# Patient Record
Sex: FEMALE | Race: WHITE | NOT HISPANIC OR LATINO | Employment: FULL TIME | URBAN - METROPOLITAN AREA
[De-identification: names, ages, dates, MRNs, and addresses within clinical notes are randomized per-mention and may not be internally consistent; named-entity substitution may affect disease eponyms.]

---

## 2017-06-28 ENCOUNTER — HOSPITAL ENCOUNTER (OUTPATIENT)
Facility: CLINIC | Age: 47
Setting detail: SPECIMEN
Discharge: HOME | End: 2017-06-28
Attending: INTERNAL MEDICINE | Admitting: INTERNAL MEDICINE
Payer: COMMERCIAL

## 2017-06-28 ENCOUNTER — ONCOLOGY VISIT (OUTPATIENT)
Dept: ONCOLOGY | Facility: CLINIC | Age: 47
End: 2017-06-28
Attending: INTERNAL MEDICINE
Payer: COMMERCIAL

## 2017-06-28 VITALS
SYSTOLIC BLOOD PRESSURE: 133 MMHG | HEART RATE: 91 BPM | TEMPERATURE: 98.4 F | BODY MASS INDEX: 23.58 KG/M2 | RESPIRATION RATE: 20 BRPM | DIASTOLIC BLOOD PRESSURE: 81 MMHG | WEIGHT: 146 LBS | OXYGEN SATURATION: 99 %

## 2017-06-28 DIAGNOSIS — C50.911 MALIGNANT NEOPLASM OF RIGHT BREAST IN FEMALE, ESTROGEN RECEPTOR POSITIVE, UNSPECIFIED SITE OF BREAST (H): Primary | ICD-10-CM

## 2017-06-28 DIAGNOSIS — Z17.0 MALIGNANT NEOPLASM OF RIGHT BREAST IN FEMALE, ESTROGEN RECEPTOR POSITIVE, UNSPECIFIED SITE OF BREAST (H): Primary | ICD-10-CM

## 2017-06-28 LAB
ALBUMIN SERPL-MCNC: 3.9 G/DL (ref 3.4–5)
ALP SERPL-CCNC: 59 U/L (ref 40–150)
ALT SERPL W P-5'-P-CCNC: 25 U/L (ref 0–50)
ANION GAP SERPL CALCULATED.3IONS-SCNC: 5 MMOL/L (ref 3–14)
AST SERPL W P-5'-P-CCNC: 21 U/L (ref 0–45)
BILIRUB SERPL-MCNC: 0.4 MG/DL (ref 0.2–1.3)
BUN SERPL-MCNC: 12 MG/DL (ref 7–30)
CALCIUM SERPL-MCNC: 8.9 MG/DL (ref 8.5–10.1)
CANCER AG27-29 SERPL-ACNC: 16 U/ML (ref 0–39)
CHLORIDE SERPL-SCNC: 109 MMOL/L (ref 94–109)
CO2 SERPL-SCNC: 27 MMOL/L (ref 20–32)
CREAT SERPL-MCNC: 0.86 MG/DL (ref 0.52–1.04)
ERYTHROCYTE [DISTWIDTH] IN BLOOD BY AUTOMATED COUNT: 12.4 % (ref 10–15)
GFR SERPL CREATININE-BSD FRML MDRD: 71 ML/MIN/1.7M2
GLUCOSE SERPL-MCNC: 105 MG/DL (ref 70–99)
HCT VFR BLD AUTO: 43.9 % (ref 35–47)
HGB BLD-MCNC: 14.4 G/DL (ref 11.7–15.7)
MCH RBC QN AUTO: 31.6 PG (ref 26.5–33)
MCHC RBC AUTO-ENTMCNC: 32.8 G/DL (ref 31.5–36.5)
MCV RBC AUTO: 96 FL (ref 78–100)
PLATELET # BLD AUTO: 160 10E9/L (ref 150–450)
POTASSIUM SERPL-SCNC: 3.8 MMOL/L (ref 3.4–5.3)
PROT SERPL-MCNC: 7.5 G/DL (ref 6.8–8.8)
RBC # BLD AUTO: 4.56 10E12/L (ref 3.8–5.2)
SODIUM SERPL-SCNC: 141 MMOL/L (ref 133–144)
WBC # BLD AUTO: 6.5 10E9/L (ref 4–11)

## 2017-06-28 PROCEDURE — 36415 COLL VENOUS BLD VENIPUNCTURE: CPT

## 2017-06-28 PROCEDURE — 86300 IMMUNOASSAY TUMOR CA 15-3: CPT | Performed by: INTERNAL MEDICINE

## 2017-06-28 PROCEDURE — 99211 OFF/OP EST MAY X REQ PHY/QHP: CPT

## 2017-06-28 PROCEDURE — 80053 COMPREHEN METABOLIC PANEL: CPT | Performed by: INTERNAL MEDICINE

## 2017-06-28 PROCEDURE — 85027 COMPLETE CBC AUTOMATED: CPT | Performed by: INTERNAL MEDICINE

## 2017-06-28 PROCEDURE — 99213 OFFICE O/P EST LOW 20 MIN: CPT | Performed by: INTERNAL MEDICINE

## 2017-06-28 ASSESSMENT — PAIN SCALES - GENERAL: PAINLEVEL: NO PAIN (0)

## 2017-06-28 NOTE — MR AVS SNAPSHOT
After Visit Summary   6/28/2017    Myla Davis    MRN: [id number]           Patient Information     Date Of Birth          1970        Visit Information        Provider Department      6/28/2017 3:30 PM Faye Landeros MD [facility]        Today's Diagnoses     Malignant neoplasm of right breast in female, estrogen receptor positive, unspecified site of breast (H)    -  1      Care Instructions        RTC MD   6 months       Labs  today  - drawn GN          Follow-ups after your visit        Your next 10 appointments already scheduled     Jun 28, 2017  3:30 PM CDT   Return Visit with Faye Landeros MD   [facility] ([facility])    [facility]  [address]  [facility] [address]   [phone]            Dec 13, 2017 11:30 AM CST   Return Visit with Faye Landeros MD   [facility] ([facility])    [facility]  [address]  [facility] [address]   [phone]              Who to contact     If you have questions or need follow up information about today's clinic visit or your schedule please contact [facility] directly at [phone].  Normal or non-critical lab and imaging results will be communicated to you by Navmiihart, letter or phone within 4 business days after the clinic has received the results. If you do not hear from us within 7 days, please contact the clinic through Navmiihart or phone. If you have a critical or abnormal lab result, we will notify you by phone as soon as possible.  Submit refill requests through [employer] or call your pharmacy and they will forward the refill request to us. Please allow 3 business days for your refill to be completed.          Additional Information About Your Visit        [employer] Information     [employer] lets you send messages  "to your doctor, view your test results, renew your prescriptions, schedule appointments and more. To sign up, go to [email or web address] . Click on \"Log in\" on the left side of the screen, which will take you to the Welcome page. Then click on \"Sign up Now\" on the right side of the page.     You will be asked to enter the access code listed below, as well as some personal information. Please follow the directions to create your username and password.     Your access code is: [email or web address]  Expires: 2017 12:21 PM     Your access code will  in 90 days. If you need help or a new code, please call your [facility] clinic or [phone].        Care EveryWhere ID     This is your Care EveryWhere ID. This could be used by other organizations to access your [facility] medical records  [id number]        Your Vitals Were     Pulse Temperature Respirations Pulse Oximetry BMI (Body Mass Index)       91 98.4  F (36.9  C) (Tympanic) 20 99% 23.58 kg/m2        Blood Pressure from Last 3 Encounters:   17 133/81   16 144/86   16 114/79    Weight from Last 3 Encounters:   17 66.2 kg (146 lb)   16 69.1 kg (152 lb 4 oz)   16 67.5 kg (148 lb 12.8 oz)              We Performed the Following     Ca27.29  breast tumor marker     CBC with platelets     Comprehensive metabolic panel (BMP + Alb, Alk Phos, ALT, AST, Total. Bili, TP)        Primary Care Provider Office Phone # Fax #    Malaika Daigle [phone]       [facility] [address] [facility] [address]        Equal Access to Services     LUKAS MENJIVAR : Henrietta Silver, kaley salinas, ruth kaalmada opal, samantha yarbrough. So [facility] [phone].    ATENCIÓN: Si habla español, tiene a berg disposición servicios gratuitos de asistencia lingüística. Llame al [phone].    We comply with applicable federal civil rights laws and [place] laws. We do not " discriminate on the basis of race, color, national origin, age, disability sex, sexual orientation or gender identity.            Thank you!     Thank you for choosing [facility]  for your care. Our goal is always to provide you with excellent care. Hearing back from our patients is one way we can continue to improve our services. Please take a few minutes to complete the written survey that you may receive in the mail after your visit with us. Thank you!             Your Updated Medication List - Protect others around you: Learn how to safely use, store and throw away your medicines at www.disposemymeds.org.          This list is accurate as of: 6/28/17 12:21 PM.  Always use your most recent med list.                   Brand Name Dispense Instructions for use Diagnosis    acetaminophen 325 MG tablet    TYLENOL    100 tablet    Take 2 tablets (650 mg) by mouth every 4 hours as needed for other (mild pain)    Breast cancer (H)       BENEFIBER DRINK MIX PO      Take 1 dose by mouth daily as needed        calcium-vitamin D 600-400 MG-UNIT per tablet    CALTRATE     Take 1 tablet by mouth daily        diphenhydrAMINE-acetaminophen  MG tablet    TYLENOL PM     Take 1 tablet by mouth nightly as needed        guaiFENesin 600 MG 12 hr tablet    MUCINEX     Take 1,200 mg by mouth 2 times daily as needed        ibuprofen 600 MG tablet    ADVIL/MOTRIN    30 tablet    Take 1 tablet (600 mg) by mouth every 6 hours as needed for pain (mild)    Superficial foreign body of axilla, right, initial encounter       Multi-vitamin Tabs tablet      Take 1 tablet by mouth daily        OMEPRAZOLE PO      Take 40 mg by mouth every morning        SUDAFED PO      Take 30 mg by mouth 2 times daily as needed        tamoxifen 20 MG tablet    NOLVADEX    90 tablet    Take 1 tablet (20 mg) by mouth daily    Malignant neoplasm of central portion of right female breast (H)       vitamin B complex with vitamin C Tabs  tablet      Take 1 tablet by mouth daily        VITAMIN C PO      Take 500 mg by mouth daily        VITAMIN D (CHOLECALCIFEROL) PO      Take 1,000 Units by mouth daily        ZYRTEC PO      Take 10 mg by mouth daily PRN

## 2017-06-28 NOTE — NURSING NOTE
"Oncology Rooming Note    June 28, 2017 11:35 AM   Myla Davis is a 46 year old female who presents for:    Chief Complaint   Patient presents with     Oncology Clinic Visit     follow up      Initial Vitals: /81  Pulse 91  Temp 98.4  F (36.9  C) (Tympanic)  Resp 20  Wt 66.2 kg (146 lb)  SpO2 99%  BMI 23.58 kg/m2 Estimated body mass index is 23.58 kg/(m^2) as calculated from the following:    Height as of 12/29/16: 1.676 m (5' 5.98\").    Weight as of this encounter: 66.2 kg (146 lb). Body surface area is 1.76 meters squared.  No Pain (0) Comment: Data Unavailable   No LMP recorded. Patient is not currently having periods (Reason: UNKNOWN).  Allergies reviewed: Yes  Medications reviewed: Yes    Medications: Medication refills not needed today.  Pharmacy name entered into [facility]:    [facility] [address] [place] [address] AT [facility] [address]  [facility] [place], [place] - [facility] [address]    Clinical concerns: Follow up      8 minutes for nursing intake (face to face time)     Susana Garcia CMA   DISCHARGE PLAN:  Next appointments: See patient instruction section  Departure Mode: Ambulatory  Accompanied by: self  0 minutes for nursing discharge (face to face time)   Susana Garcia CMA                  "

## 2017-07-09 NOTE — PROGRESS NOTES
Hematology / Oncology Progress Note         Assessment & Plan   Myla Davis is a 46 year old female diagnosed at the age 42 with lobular carcinoma of the right breast.  She will continue on tamoxifen.  The plan is to do tamoxifen for 5 years and then follow up with an aromatase inhibitor for 5 years. I will see her back in 6 months .          Faye Landeros    Interval History      Myla Davis is 45 years old with a diagnosis of breast cancer.  She was diagnosed at age 42 in 09/2013.  At that point she had an invasive lobular carcinoma of the right breast.  She had multiple foci and negative lymph nodes, ER/OR positive and HER2 receptor negative.  She elected to do bilateral mastectomies.  Prior to doing the bilateral mastectomies she had neoadjuvant dose-dense Adriamycin, Cytoxan followed by dose-dense Taxol.  She is currently now status post bilateral mastectomies post-mastectomy radiation therapy and on tamoxifen.  She has been on the tamoxifen now about 3 1/2 years.  She is tolerating it well without any major side effects.  She denies today cough, shortness of breath, bone pain, anything worrisome.     14 point ROS neg     Meds as charted   Allergies as charted  Physical Exam                      Vitals:    06/28/17 1130   Weight: 66.2 kg (146 lb)     Vital Signs normal        Constitutional: awake, alert, cooperative, no apparent distress, and appears stated age  Eyes: Lids and lashes normal, pupils equal, round and reactive to light, extra ocular muscles intact, sclera clear, conjunctiva normal  ENT: Normocephalic, without obvious abnormality, atramatic, sinuses nontender on palpation, external ears without lesions, oral pharynx with moist mucus membranes, tonsils without erythema or exudates, gums normal and good dentition.  Hematologic / Lymphatic: normal   Respiratory: No increased work of breathing, good air exchange, clear to auscultation bilaterally, no crackles or  wheezing  Cardiovascular: Normal apical impulse, regular rate and rhythm, normal S1 and S2, no S3 or S4, and no murmur noted  GI: normal   Skin: normal skin color, texture, turgor  Neurologic: Awake, alert, oriented to name, place and time.    Breast    bilat mastectomies             Data   Labs pending

## 2017-07-14 ENCOUNTER — TELEPHONE (OUTPATIENT)
Dept: ONCOLOGY | Facility: CLINIC | Age: 47
End: 2017-07-14

## 2017-07-14 NOTE — TELEPHONE ENCOUNTER
Called patient per Dr. Landeros, and left voicemail message that her recent blood work was all normal.

## 2017-12-20 ENCOUNTER — ONCOLOGY VISIT (OUTPATIENT)
Dept: ONCOLOGY | Facility: CLINIC | Age: 47
End: 2017-12-20
Attending: NURSE PRACTITIONER
Payer: COMMERCIAL

## 2017-12-20 ENCOUNTER — HOSPITAL ENCOUNTER (OUTPATIENT)
Facility: CLINIC | Age: 47
Setting detail: SPECIMEN
Discharge: HOME | End: 2017-12-20
Attending: NURSE PRACTITIONER | Admitting: NURSE PRACTITIONER
Payer: COMMERCIAL

## 2017-12-20 VITALS
BODY MASS INDEX: 24.68 KG/M2 | DIASTOLIC BLOOD PRESSURE: 81 MMHG | SYSTOLIC BLOOD PRESSURE: 130 MMHG | HEART RATE: 80 BPM | WEIGHT: 152.8 LBS | TEMPERATURE: 98 F | OXYGEN SATURATION: 100 % | RESPIRATION RATE: 16 BRPM

## 2017-12-20 DIAGNOSIS — C50.111 MALIGNANT NEOPLASM OF CENTRAL PORTION OF RIGHT BREAST IN FEMALE, ESTROGEN RECEPTOR POSITIVE (H): ICD-10-CM

## 2017-12-20 DIAGNOSIS — Z17.0 MALIGNANT NEOPLASM OF CENTRAL PORTION OF RIGHT BREAST IN FEMALE, ESTROGEN RECEPTOR POSITIVE (H): ICD-10-CM

## 2017-12-20 DIAGNOSIS — C50.411 MALIGNANT NEOPLASM OF UPPER-OUTER QUADRANT OF RIGHT BREAST IN FEMALE, ESTROGEN RECEPTOR POSITIVE (H): Primary | ICD-10-CM

## 2017-12-20 DIAGNOSIS — Z17.0 MALIGNANT NEOPLASM OF UPPER-OUTER QUADRANT OF RIGHT BREAST IN FEMALE, ESTROGEN RECEPTOR POSITIVE (H): Primary | ICD-10-CM

## 2017-12-20 LAB
ALBUMIN SERPL-MCNC: 3.6 G/DL (ref 3.4–5)
ALP SERPL-CCNC: 57 U/L (ref 40–150)
ALT SERPL W P-5'-P-CCNC: 24 U/L (ref 0–50)
ANION GAP SERPL CALCULATED.3IONS-SCNC: 7 MMOL/L (ref 3–14)
AST SERPL W P-5'-P-CCNC: 15 U/L (ref 0–45)
BASOPHILS # BLD AUTO: 0.1 10E9/L (ref 0–0.2)
BASOPHILS NFR BLD AUTO: 1.1 %
BILIRUB SERPL-MCNC: 0.3 MG/DL (ref 0.2–1.3)
BUN SERPL-MCNC: 16 MG/DL (ref 7–30)
CALCIUM SERPL-MCNC: 8.8 MG/DL (ref 8.5–10.1)
CANCER AG27-29 SERPL-ACNC: 5 U/ML (ref 0–39)
CHLORIDE SERPL-SCNC: 107 MMOL/L (ref 94–109)
CO2 SERPL-SCNC: 28 MMOL/L (ref 20–32)
CREAT SERPL-MCNC: 0.72 MG/DL (ref 0.52–1.04)
DIFFERENTIAL METHOD BLD: NORMAL
EOSINOPHIL # BLD AUTO: 0.1 10E9/L (ref 0–0.7)
EOSINOPHIL NFR BLD AUTO: 1.9 %
ERYTHROCYTE [DISTWIDTH] IN BLOOD BY AUTOMATED COUNT: 12.1 % (ref 10–15)
GFR SERPL CREATININE-BSD FRML MDRD: 87 ML/MIN/1.7M2
GLUCOSE SERPL-MCNC: 98 MG/DL (ref 70–99)
HCT VFR BLD AUTO: 39.4 % (ref 35–47)
HGB BLD-MCNC: 12.9 G/DL (ref 11.7–15.7)
IMM GRANULOCYTES # BLD: 0 10E9/L (ref 0–0.4)
IMM GRANULOCYTES NFR BLD: 0.2 %
LYMPHOCYTES # BLD AUTO: 1.6 10E9/L (ref 0.8–5.3)
LYMPHOCYTES NFR BLD AUTO: 34.3 %
MCH RBC QN AUTO: 31.4 PG (ref 26.5–33)
MCHC RBC AUTO-ENTMCNC: 32.7 G/DL (ref 31.5–36.5)
MCV RBC AUTO: 96 FL (ref 78–100)
MONOCYTES # BLD AUTO: 0.3 10E9/L (ref 0–1.3)
MONOCYTES NFR BLD AUTO: 6.2 %
NEUTROPHILS # BLD AUTO: 2.6 10E9/L (ref 1.6–8.3)
NEUTROPHILS NFR BLD AUTO: 56.3 %
NRBC # BLD AUTO: 0 10*3/UL
NRBC BLD AUTO-RTO: 0 /100
PLATELET # BLD AUTO: 178 10E9/L (ref 150–450)
POTASSIUM SERPL-SCNC: 4 MMOL/L (ref 3.4–5.3)
PROT SERPL-MCNC: 7.3 G/DL (ref 6.8–8.8)
RBC # BLD AUTO: 4.11 10E12/L (ref 3.8–5.2)
SODIUM SERPL-SCNC: 142 MMOL/L (ref 133–144)
WBC # BLD AUTO: 4.7 10E9/L (ref 4–11)

## 2017-12-20 PROCEDURE — 99213 OFFICE O/P EST LOW 20 MIN: CPT | Performed by: NURSE PRACTITIONER

## 2017-12-20 PROCEDURE — 86300 IMMUNOASSAY TUMOR CA 15-3: CPT | Performed by: NURSE PRACTITIONER

## 2017-12-20 PROCEDURE — 80053 COMPREHEN METABOLIC PANEL: CPT | Performed by: NURSE PRACTITIONER

## 2017-12-20 PROCEDURE — 99211 OFF/OP EST MAY X REQ PHY/QHP: CPT

## 2017-12-20 PROCEDURE — 85025 COMPLETE CBC W/AUTO DIFF WBC: CPT | Performed by: NURSE PRACTITIONER

## 2017-12-20 RX ORDER — TAMOXIFEN CITRATE 20 MG/1
20 TABLET ORAL DAILY
Qty: 90 TABLET | Refills: 3 | Status: SHIPPED | OUTPATIENT
Start: 2017-12-20 | End: 2019-07-01

## 2017-12-20 RX ORDER — TAMOXIFEN CITRATE 20 MG/1
20 TABLET ORAL DAILY
Qty: 90 TABLET | Refills: 3 | Status: CANCELLED | OUTPATIENT
Start: 2017-12-20

## 2017-12-20 ASSESSMENT — PAIN SCALES - GENERAL: PAINLEVEL: NO PAIN (0)

## 2017-12-20 NOTE — PATIENT INSTRUCTIONS
Labs today    Schedule with Dr Landeros in 6 months -Scheduled for 6/20/18. Tish GALVIN  AVS printed given to Pt. Tish GALVIN

## 2017-12-20 NOTE — PROGRESS NOTES
Oncology/Hematology Visit Note  Dec 20, 2017    Reason for Visit: follow up of  Right breast cancer     History of Present Illness: Myla Davis is a 47 year old female with  Right breast cancer . She is currently undergoing treatment with tamoxifen. Please see previous notes for further details on the patient's history. She comes in today for routine follow     Interval History:    She is feeling great, she denies fever chills or sweats, denies cough or SOB , denies NVD, denies abdominal pain, she dneis adenopathy she has been tolerating tamoxifen well      Review of Systems:  14 point ROS of systems including Constitutional, Eyes, Respiratory, Cardiovascular, Gastroenterology, Genitourinary, Integumentary, Muscularskeletal, Psychiatric were all negative except for pertinent positives noted in my HPI.      Current Outpatient Prescriptions   Medication Sig Dispense Refill     tamoxifen (NOLVADEX) 20 MG tablet Take 1 tablet (20 mg) by mouth daily 90 tablet 3     multivitamin, therapeutic with minerals (MULTI-VITAMIN) TABS Take 1 tablet by mouth daily       VITAMIN D, CHOLECALCIFEROL, PO Take 1,000 Units by mouth daily       vitamin  B complex with vitamin C (VITAMIN  B COMPLEX) TABS Take 1 tablet by mouth daily       Ascorbic Acid (VITAMIN C PO) Take 500 mg by mouth daily       OMEPRAZOLE PO Take 40 mg by mouth every morning       ibuprofen (ADVIL,MOTRIN) 600 MG tablet Take 1 tablet (600 mg) by mouth every 6 hours as needed for pain (mild) 30 tablet 0     calcium-vitamin D (CALTRATE) 600-400 MG-UNIT per tablet Take 1 tablet by mouth daily       guaiFENesin (MUCINEX) 600 MG 12 hr tablet Take 1,200 mg by mouth 2 times daily as needed       Wheat Dextrin (BENEFIBER DRINK MIX PO) Take 1 dose by mouth daily as needed        Pseudoephedrine HCl (SUDAFED PO) Take 30 mg by mouth 2 times daily as needed        diphenhydrAMINE-acetaminophen (TYLENOL PM)  MG tablet Take 1 tablet by mouth nightly as needed        acetaminophen (TYLENOL) 325 MG tablet Take 2 tablets (650 mg) by mouth every 4 hours as needed for other (mild pain) 100 tablet 0     Cetirizine HCl (ZYRTEC PO) Take 10 mg by mouth daily PRN          [DISCONTINUED] tamoxifen (NOLVADEX) 20 MG tablet Take 1 tablet (20 mg) by mouth daily 90 tablet 3       Physical Examination:  General: The patient is a pleasant female in no acute distress.  /81  Pulse 80  Temp 98  F (36.7  C) (Tympanic)  Resp 16  Wt 69.3 kg (152 lb 12.8 oz)  SpO2 100%  BMI 24.68 kg/m2  Wt Readings from Last 10 Encounters:   12/20/17 69.3 kg (152 lb 12.8 oz)   06/28/17 66.2 kg (146 lb)   12/29/16 69.1 kg (152 lb 4 oz)   06/29/16 67.5 kg (148 lb 12.8 oz)   12/30/15 67.1 kg (148 lb)   08/25/15 64.9 kg (143 lb)   04/16/15 64.4 kg (142 lb)   12/15/14 60.8 kg (134 lb)   11/04/14 61.2 kg (135 lb)   07/08/14 59.9 kg (132 lb)     HEENT: EOMI, PERRL. Sclerae are anicteric. Oral mucosa is pink and moist with no lesions or thrush.   Lymph: Neck is supple with no lymphadenopathy in the cervical or supraclavicular areas.   Heart: Regular rate and rhythm.   Lungs: Clear to auscultation bilaterally.   GI: Bowel sounds present, soft, nontender with no palpable hepatosplenomegaly or masses.   Extremities: No lower extremity edema noted bilaterally.     Skin: No rashes, petechiae, or bruising noted on exposed skin.    Laboratory Data:  Results for DEBBIE FARIAS (MRN [id number]) as of 12/20/2017 13:40   Ref. Range 12/20/2017 11:40   Sodium Latest Ref Range: 133 - 144 mmol/L 142   Potassium Latest Ref Range: 3.4 - 5.3 mmol/L 4.0   Chloride Latest Ref Range: 94 - 109 mmol/L 107   Carbon Dioxide Latest Ref Range: 20 - 32 mmol/L 28   Urea Nitrogen Latest Ref Range: 7 - 30 mg/dL 16   Creatinine Latest Ref Range: 0.52 - 1.04 mg/dL 0.72   GFR Estimate Latest Ref Range: >60 mL/min/1.7m2 87   GFR Estimate If Black Latest Ref Range: >60 mL/min/1.7m2 >90   Calcium Latest Ref Range: 8.5 - 10.1 mg/dL 8.8   Anion Gap  Latest Ref Range: 3 - 14 mmol/L 7   Albumin Latest Ref Range: 3.4 - 5.0 g/dL 3.6   Protein Total Latest Ref Range: 6.8 - 8.8 g/dL 7.3   Bilirubin Total Latest Ref Range: 0.2 - 1.3 mg/dL 0.3   Alkaline Phosphatase Latest Ref Range: 40 - 150 U/L 57   ALT Latest Ref Range: 0 - 50 U/L 24   AST Latest Ref Range: 0 - 45 U/L 15   Glucose Latest Ref Range: 70 - 99 mg/dL 98   WBC Latest Ref Range: 4.0 - 11.0 10e9/L 4.7   Hemoglobin Latest Ref Range: 11.7 - 15.7 g/dL 12.9   Hematocrit Latest Ref Range: 35.0 - 47.0 % 39.4   Platelet Count Latest Ref Range: 150 - 450 10e9/L 178   RBC Count Latest Ref Range: 3.8 - 5.2 10e12/L 4.11   MCV Latest Ref Range: 78 - 100 fl 96   MCH Latest Ref Range: 26.5 - 33.0 pg 31.4   MCHC Latest Ref Range: 31.5 - 36.5 g/dL 32.7   RDW Latest Ref Range: 10.0 - 15.0 % 12.1   Diff Method Unknown Automated Method   % Neutrophils Latest Units: % 56.3   % Lymphocytes Latest Units: % 34.3   % Monocytes Latest Units: % 6.2   % Eosinophils Latest Units: % 1.9   % Basophils Latest Units: % 1.1   % Immature Granulocytes Latest Units: % 0.2   Nucleated RBCs Latest Ref Range: 0 /100 0   Absolute Neutrophil Latest Ref Range: 1.6 - 8.3 10e9/L 2.6   Absolute Lymphocytes Latest Ref Range: 0.8 - 5.3 10e9/L 1.6   Absolute Monocytes Latest Ref Range: 0.0 - 1.3 10e9/L 0.3   Absolute Eosinophils Latest Ref Range: 0.0 - 0.7 10e9/L 0.1   Absolute Basophils Latest Ref Range: 0.0 - 0.2 10e9/L 0.1   Abs Immature Granulocytes Latest Ref Range: 0 - 0.4 10e9/L 0.0   Absolute Nucleated RBC Unknown 0.0       Assessment and Plan:    Breast cancer   ER/NV positive and HER2 receptor negative  S/p AC and taxol   S/p  bilateral mastectomies post-mastectomy radiation therapy currently on tamoxifen. She has been on it for almost 4 years . No evidence of cancer recurrence.  Plan is to continue with it tamoxifen for 1 more year -total 5 years then follow up with an aromatase inhibitor for 5 years.  Check labs check CA27-29 Schedule with   Tigre  in 6 months.    EMERSON Graham CNP

## 2017-12-20 NOTE — LETTER
12/20/2017         RE: Myla Davis  [address] [facility] [address]        Dear Colleague,    Thank you for referring your patient, Myla Davis, to the [facility]. Please see a copy of my visit note below.    Oncology/Hematology Visit Note  Dec 20, 2017    Reason for Visit: follow up of  Right breast cancer     History of Present Illness: Myla Davis is a 47 year old female with  Right breast cancer . She is currently undergoing treatment with tamoxifen. Please see previous notes for further details on the patient's history. She comes in today for routine follow     Interval History:    She is feeling great, she denies fever chills or sweats, denies cough or SOB , denies NVD, denies abdominal pain, she dneis adenopathy she has been tolerating tamoxifen well      Review of Systems:  14 point ROS of systems including Constitutional, Eyes, Respiratory, Cardiovascular, Gastroenterology, Genitourinary, Integumentary, Muscularskeletal, Psychiatric were all negative except for pertinent positives noted in my HPI.      Current Outpatient Prescriptions   Medication Sig Dispense Refill     tamoxifen (NOLVADEX) 20 MG tablet Take 1 tablet (20 mg) by mouth daily 90 tablet 3     multivitamin, therapeutic with minerals (MULTI-VITAMIN) TABS Take 1 tablet by mouth daily       VITAMIN D, CHOLECALCIFEROL, PO Take 1,000 Units by mouth daily       vitamin  B complex with vitamin C (VITAMIN  B COMPLEX) TABS Take 1 tablet by mouth daily       Ascorbic Acid (VITAMIN C PO) Take 500 mg by mouth daily       OMEPRAZOLE PO Take 40 mg by mouth every morning       ibuprofen (ADVIL,MOTRIN) 600 MG tablet Take 1 tablet (600 mg) by mouth every 6 hours as needed for pain (mild) 30 tablet 0     calcium-vitamin D (CALTRATE) 600-400 MG-UNIT per tablet Take 1 tablet by mouth daily       guaiFENesin (MUCINEX) 600 MG 12 hr tablet Take 1,200 mg by mouth 2 times daily as needed       Wheat Dextrin  (BENEFIBER DRINK MIX PO) Take 1 dose by mouth daily as needed        Pseudoephedrine HCl (SUDAFED PO) Take 30 mg by mouth 2 times daily as needed        diphenhydrAMINE-acetaminophen (TYLENOL PM)  MG tablet Take 1 tablet by mouth nightly as needed       acetaminophen (TYLENOL) 325 MG tablet Take 2 tablets (650 mg) by mouth every 4 hours as needed for other (mild pain) 100 tablet 0     Cetirizine HCl (ZYRTEC PO) Take 10 mg by mouth daily PRN          [DISCONTINUED] tamoxifen (NOLVADEX) 20 MG tablet Take 1 tablet (20 mg) by mouth daily 90 tablet 3       Physical Examination:  General: The patient is a pleasant female in no acute distress.  /81  Pulse 80  Temp 98  F (36.7  C) (Tympanic)  Resp 16  Wt 69.3 kg (152 lb 12.8 oz)  SpO2 100%  BMI 24.68 kg/m2  Wt Readings from Last 10 Encounters:   12/20/17 69.3 kg (152 lb 12.8 oz)   06/28/17 66.2 kg (146 lb)   12/29/16 69.1 kg (152 lb 4 oz)   06/29/16 67.5 kg (148 lb 12.8 oz)   12/30/15 67.1 kg (148 lb)   08/25/15 64.9 kg (143 lb)   04/16/15 64.4 kg (142 lb)   12/15/14 60.8 kg (134 lb)   11/04/14 61.2 kg (135 lb)   07/08/14 59.9 kg (132 lb)     HEENT: EOMI, PERRL. Sclerae are anicteric. Oral mucosa is pink and moist with no lesions or thrush.   Lymph: Neck is supple with no lymphadenopathy in the cervical or supraclavicular areas.   Heart: Regular rate and rhythm.   Lungs: Clear to auscultation bilaterally.   GI: Bowel sounds present, soft, nontender with no palpable hepatosplenomegaly or masses.   Extremities: No lower extremity edema noted bilaterally.     Skin: No rashes, petechiae, or bruising noted on exposed skin.    Laboratory Data:  Results for DEBBIE FARIAS (MRN [id number]) as of 12/20/2017 13:40   Ref. Range 12/20/2017 11:40   Sodium Latest Ref Range: 133 - 144 mmol/L 142   Potassium Latest Ref Range: 3.4 - 5.3 mmol/L 4.0   Chloride Latest Ref Range: 94 - 109 mmol/L 107   Carbon Dioxide Latest Ref Range: 20 - 32 mmol/L 28   Urea Nitrogen  Latest Ref Range: 7 - 30 mg/dL 16   Creatinine Latest Ref Range: 0.52 - 1.04 mg/dL 0.72   GFR Estimate Latest Ref Range: >60 mL/min/1.7m2 87   GFR Estimate If Black Latest Ref Range: >60 mL/min/1.7m2 >90   Calcium Latest Ref Range: 8.5 - 10.1 mg/dL 8.8   Anion Gap Latest Ref Range: 3 - 14 mmol/L 7   Albumin Latest Ref Range: 3.4 - 5.0 g/dL 3.6   Protein Total Latest Ref Range: 6.8 - 8.8 g/dL 7.3   Bilirubin Total Latest Ref Range: 0.2 - 1.3 mg/dL 0.3   Alkaline Phosphatase Latest Ref Range: 40 - 150 U/L 57   ALT Latest Ref Range: 0 - 50 U/L 24   AST Latest Ref Range: 0 - 45 U/L 15   Glucose Latest Ref Range: 70 - 99 mg/dL 98   WBC Latest Ref Range: 4.0 - 11.0 10e9/L 4.7   Hemoglobin Latest Ref Range: 11.7 - 15.7 g/dL 12.9   Hematocrit Latest Ref Range: 35.0 - 47.0 % 39.4   Platelet Count Latest Ref Range: 150 - 450 10e9/L 178   RBC Count Latest Ref Range: 3.8 - 5.2 10e12/L 4.11   MCV Latest Ref Range: 78 - 100 fl 96   MCH Latest Ref Range: 26.5 - 33.0 pg 31.4   MCHC Latest Ref Range: 31.5 - 36.5 g/dL 32.7   RDW Latest Ref Range: 10.0 - 15.0 % 12.1   Diff Method Unknown Automated Method   % Neutrophils Latest Units: % 56.3   % Lymphocytes Latest Units: % 34.3   % Monocytes Latest Units: % 6.2   % Eosinophils Latest Units: % 1.9   % Basophils Latest Units: % 1.1   % Immature Granulocytes Latest Units: % 0.2   Nucleated RBCs Latest Ref Range: 0 /100 0   Absolute Neutrophil Latest Ref Range: 1.6 - 8.3 10e9/L 2.6   Absolute Lymphocytes Latest Ref Range: 0.8 - 5.3 10e9/L 1.6   Absolute Monocytes Latest Ref Range: 0.0 - 1.3 10e9/L 0.3   Absolute Eosinophils Latest Ref Range: 0.0 - 0.7 10e9/L 0.1   Absolute Basophils Latest Ref Range: 0.0 - 0.2 10e9/L 0.1   Abs Immature Granulocytes Latest Ref Range: 0 - 0.4 10e9/L 0.0   Absolute Nucleated RBC Unknown 0.0       Assessment and Plan:    Breast cancer   ER/[place] positive and HER2 receptor negative  S/p AC and taxol   S/p  bilateral mastectomies post-mastectomy radiation therapy  currently on tamoxifen. She has been on it for almost 4 years . No evidence of cancer recurrence.  Plan is to continue with it tamoxifen for 1 more year -total 5 years then follow up with an aromatase inhibitor for 5 years.  Check labs check CA27-29 Schedule with Dr Landeros  in 6 months.    EMERSON Graham CNP              Again, thank you for allowing me to participate in the care of your patient.        Sincerely,        EMERSON Graham CNP

## 2017-12-20 NOTE — MR AVS SNAPSHOT
"              After Visit Summary   12/20/2017    Myla Davis    MRN: [id number]           Patient Information     Date Of Birth          1970        Visit Information        Provider Department      12/20/2017 11:00 AM Flo Scott APRN CNP [facility]        Today's Diagnoses     Malignant neoplasm of upper-outer quadrant of right breast in female, estrogen receptor positive (H)    -  1    Malignant neoplasm of central portion of right breast in female, estrogen receptor positive (H)          Care Instructions    Labs today    Schedule with Dr Landeros in 6 months             Follow-ups after your visit        Your next 10 appointments already scheduled     Jun 20, 2018 10:30 AM CDT   Return Visit with Faye Landeros MD   [facility] ([facility])    [facility]  [address] [address]  [address] [address] [phone]              Who to contact     If you have questions or need follow up information about today's clinic visit or your schedule please contact [facility] directly at [phone].  Normal or non-critical lab and imaging results will be communicated to you by MyChart, letter or phone within 4 business days after the clinic has received the results. If you do not hear from us within 7 days, please contact the clinic through AMXhart or phone. If you have a critical or abnormal lab result, we will notify you by phone as soon as possible.  Submit refill requests through TrashOut or call your pharmacy and they will forward the refill request to us. Please allow 3 business days for your refill to be completed.          Additional Information About Your Visit        MyChart Information     TrashOut lets you send messages to your doctor, view your test results, renew your prescriptions, schedule appointments and more. To sign up, go to [email or web address] . Click on \"Log " "in\" on the left side of the screen, which will take you to the Welcome page. Then click on \"Sign up Now\" on the right side of the page.     You will be asked to enter the access code listed below, as well as some personal information. Please follow the directions to create your username and password.     Your access code is: [email or web address]  Expires: 3/20/2018 11:30 AM     Your access code will  in 90 days. If you need help or a new code, please call your [place] clinic or [phone].        Care EveryWhere ID     This is your Care EveryWhere ID. This could be used by other organizations to access your [place] medical records  [id number]        Your Vitals Were     Pulse Temperature Respirations Pulse Oximetry BMI (Body Mass Index)       80 98  F (36.7  C) (Tympanic) 16 100% 24.68 kg/m2        Blood Pressure from Last 3 Encounters:   17 130/81   17 133/81   16 144/86    Weight from Last 3 Encounters:   17 69.3 kg (152 lb 12.8 oz)   17 66.2 kg (146 lb)   16 69.1 kg (152 lb 4 oz)              We Performed the Following     Comprehensive metabolic panel (BMP + Alb, Alk Phos, ALT, AST, Total. Bili, TP)          Where to get your medicines      These medications were sent to [facility] [address] [place] [address]  [facility] [[email or web address]] AT [facility] &  [address] [place] [address]     Phone:  [phone]     tamoxifen 20 MG tablet          Primary Care Provider Office Phone # Fax #    Malaika Daigle [phone]       [facility] [address] [facility] [address]        Equal Access to Services     [facility] OTTO AH: Henrietta Silver, waaxda luqadaha, qaybta kaalmada opal, samantha yarbrough. So [facility] [phone].    ATENCIÓN: Si habla español, tiene a berg disposición servicios gratuitos de asistencia lingüística. Llame al [phone].    We comply with applicable federal civil rights laws " and [place] laws. We do not discriminate on the basis of race, color, national origin, age, disability, sex, sexual orientation, or gender identity.            Thank you!     Thank you for choosing [facility]  for your care. Our goal is always to provide you with excellent care. Hearing back from our patients is one way we can continue to improve our services. Please take a few minutes to complete the written survey that you may receive in the mail after your visit with us. Thank you!             Your Updated Medication List - Protect others around you: Learn how to safely use, store and throw away your medicines at www.disposemymeds.org.          This list is accurate as of: 12/20/17 11:48 AM.  Always use your most recent med list.                   Brand Name Dispense Instructions for use Diagnosis    acetaminophen 325 MG tablet    TYLENOL    100 tablet    Take 2 tablets (650 mg) by mouth every 4 hours as needed for other (mild pain)    Breast cancer (H)       BENEFIBER DRINK MIX PO      Take 1 dose by mouth daily as needed        calcium-vitamin D 600-400 MG-UNIT per tablet    CALTRATE     Take 1 tablet by mouth daily        diphenhydrAMINE-acetaminophen  MG tablet    TYLENOL PM     Take 1 tablet by mouth nightly as needed        guaiFENesin 600 MG 12 hr tablet    MUCINEX     Take 1,200 mg by mouth 2 times daily as needed        ibuprofen 600 MG tablet    ADVIL/MOTRIN    30 tablet    Take 1 tablet (600 mg) by mouth every 6 hours as needed for pain (mild)    Superficial foreign body of axilla, right, initial encounter       Multi-vitamin Tabs tablet      Take 1 tablet by mouth daily        OMEPRAZOLE PO      Take 40 mg by mouth every morning        SUDAFED PO      Take 30 mg by mouth 2 times daily as needed        tamoxifen 20 MG tablet    NOLVADEX    90 tablet    Take 1 tablet (20 mg) by mouth daily    Malignant neoplasm of central portion of right breast in female, estrogen  receptor positive (H)       vitamin B complex with vitamin C Tabs tablet      Take 1 tablet by mouth daily        VITAMIN C PO      Take 500 mg by mouth daily        VITAMIN D (CHOLECALCIFEROL) PO      Take 1,000 Units by mouth daily        ZYRTEC PO      Take 10 mg by mouth daily PRN

## 2017-12-20 NOTE — NURSING NOTE
"Oncology Rooming Note    December 20, 2017 11:02 AM   Myla Davis is a 47 year old female who presents for:    Chief Complaint   Patient presents with     Oncology Clinic Visit     Follow-up     Initial Vitals: /81  Pulse 80  Temp 98  F (36.7  C) (Tympanic)  Resp 16  Wt 69.3 kg (152 lb 12.8 oz)  SpO2 100%  BMI 24.68 kg/m2 Estimated body mass index is 24.68 kg/(m^2) as calculated from the following:    Height as of 12/29/16: 1.676 m (5' 5.98\").    Weight as of this encounter: 69.3 kg (152 lb 12.8 oz). Body surface area is 1.8 meters squared.  No Pain (0) Comment: Data Unavailable   No LMP recorded. Patient is not currently having periods (Reason: UNKNOWN).  Allergies reviewed: Yes  Medications reviewed: Yes    Medications: MEDICATION REFILLS NEEDED TODAY. Provider was notified. Tamoxifen  Pharmacy name entered into [facility]:    [facility] [address] [place] [address] - [address] [place] [address] AT [facility] [address]  [facility] [place], [place] - [facility] [address]    Clinical concerns: Follow up    8 minutes for nursing intake (face to face time)     Wendie Lozano CMA      DISCHARGE PLAN:  Next appointments: See patient instruction section  Departure Mode: Ambulatory  Accompanied by: self  0 minutes for nursing discharge (face to face time)   Wendie Lozano CMA                "

## 2018-06-27 ENCOUNTER — ONCOLOGY VISIT (OUTPATIENT)
Dept: ONCOLOGY | Facility: CLINIC | Age: 48
End: 2018-06-27
Attending: INTERNAL MEDICINE
Payer: COMMERCIAL

## 2018-06-27 ENCOUNTER — HOSPITAL ENCOUNTER (OUTPATIENT)
Facility: CLINIC | Age: 48
Setting detail: SPECIMEN
Discharge: HOME | End: 2018-06-27
Attending: INTERNAL MEDICINE | Admitting: INTERNAL MEDICINE
Payer: COMMERCIAL

## 2018-06-27 VITALS
OXYGEN SATURATION: 99 % | HEART RATE: 81 BPM | TEMPERATURE: 98.1 F | BODY MASS INDEX: 23.85 KG/M2 | RESPIRATION RATE: 16 BRPM | SYSTOLIC BLOOD PRESSURE: 122 MMHG | DIASTOLIC BLOOD PRESSURE: 76 MMHG | WEIGHT: 148.4 LBS | HEIGHT: 66 IN

## 2018-06-27 DIAGNOSIS — Z17.0 MALIGNANT NEOPLASM OF RIGHT BREAST IN FEMALE, ESTROGEN RECEPTOR POSITIVE, UNSPECIFIED SITE OF BREAST (H): Primary | ICD-10-CM

## 2018-06-27 DIAGNOSIS — C50.911 MALIGNANT NEOPLASM OF RIGHT BREAST IN FEMALE, ESTROGEN RECEPTOR POSITIVE, UNSPECIFIED SITE OF BREAST (H): Primary | ICD-10-CM

## 2018-06-27 LAB
ALBUMIN SERPL-MCNC: 3.9 G/DL (ref 3.4–5)
ALP SERPL-CCNC: 61 U/L (ref 40–150)
ALT SERPL W P-5'-P-CCNC: 19 U/L (ref 0–50)
ANION GAP SERPL CALCULATED.3IONS-SCNC: 5 MMOL/L (ref 3–14)
AST SERPL W P-5'-P-CCNC: 16 U/L (ref 0–45)
BILIRUB SERPL-MCNC: 0.3 MG/DL (ref 0.2–1.3)
BUN SERPL-MCNC: 12 MG/DL (ref 7–30)
CALCIUM SERPL-MCNC: 8.9 MG/DL (ref 8.5–10.1)
CANCER AG27-29 SERPL-ACNC: 18 U/ML (ref 0–39)
CHLORIDE SERPL-SCNC: 105 MMOL/L (ref 94–109)
CO2 SERPL-SCNC: 29 MMOL/L (ref 20–32)
CREAT SERPL-MCNC: 0.77 MG/DL (ref 0.52–1.04)
ERYTHROCYTE [DISTWIDTH] IN BLOOD BY AUTOMATED COUNT: 11.8 % (ref 10–15)
GFR SERPL CREATININE-BSD FRML MDRD: 80 ML/MIN/1.7M2
GLUCOSE SERPL-MCNC: 93 MG/DL (ref 70–99)
HCT VFR BLD AUTO: 41.9 % (ref 35–47)
HGB BLD-MCNC: 13.9 G/DL (ref 11.7–15.7)
MCH RBC QN AUTO: 31 PG (ref 26.5–33)
MCHC RBC AUTO-ENTMCNC: 33.2 G/DL (ref 31.5–36.5)
MCV RBC AUTO: 93 FL (ref 78–100)
PLATELET # BLD AUTO: 198 10E9/L (ref 150–450)
POTASSIUM SERPL-SCNC: 4 MMOL/L (ref 3.4–5.3)
PROT SERPL-MCNC: 7.8 G/DL (ref 6.8–8.8)
RBC # BLD AUTO: 4.49 10E12/L (ref 3.8–5.2)
SODIUM SERPL-SCNC: 139 MMOL/L (ref 133–144)
WBC # BLD AUTO: 5.5 10E9/L (ref 4–11)

## 2018-06-27 PROCEDURE — 85027 COMPLETE CBC AUTOMATED: CPT | Performed by: INTERNAL MEDICINE

## 2018-06-27 PROCEDURE — G0463 HOSPITAL OUTPT CLINIC VISIT: HCPCS

## 2018-06-27 PROCEDURE — 86300 IMMUNOASSAY TUMOR CA 15-3: CPT | Performed by: INTERNAL MEDICINE

## 2018-06-27 PROCEDURE — 80053 COMPREHEN METABOLIC PANEL: CPT | Performed by: INTERNAL MEDICINE

## 2018-06-27 PROCEDURE — 36415 COLL VENOUS BLD VENIPUNCTURE: CPT

## 2018-06-27 PROCEDURE — 99214 OFFICE O/P EST MOD 30 MIN: CPT | Performed by: INTERNAL MEDICINE

## 2018-06-27 ASSESSMENT — PAIN SCALES - GENERAL: PAINLEVEL: NO PAIN (0)

## 2018-06-27 NOTE — NURSING NOTE
"Oncology Rooming Note    June 27, 2018 2:43 PM   Myla Davis is a 47 year old female who presents for:    Chief Complaint   Patient presents with     Oncology Clinic Visit     Initial Vitals: Ht 1.676 m (5' 5.98\")  Wt 67.3 kg (148 lb 6.4 oz)  BMI 23.97 kg/m2 Estimated body mass index is 23.97 kg/(m^2) as calculated from the following:    Height as of this encounter: 1.676 m (5' 5.98\").    Weight as of this encounter: 67.3 kg (148 lb 6.4 oz). Body surface area is 1.77 meters squared.  No Pain (0) Comment: Data Unavailable   No LMP recorded. Patient is not currently having periods (Reason: UNKNOWN).  Allergies reviewed: Yes  Medications reviewed: Yes    Medications: Medication refills not needed today.  Pharmacy name entered into [facility]:    [facility] [address] [place] [address] AT [facility] [address] & [address]TH  [facility] [place], [place] - [facility] [address]    Clinical concerns: Follow-up    8 minutes for nursing intake (face to face time)     DISCHARGE PLAN:  Next appointments: See patient instruction section  Departure Mode: Ambulatory  Accompanied by: self  0 minutes for nursing discharge (face to face time)   Suzette Gerber                "

## 2018-06-27 NOTE — PROGRESS NOTES
Visit Date:   06/27/2018      HISTORY OF PRESENT ILLNESS:  Myla Davis is a 47-year-old patient who comes in today for interim followup.  She was diagnosed about 5 years ago with lobular carcinoma in the upper outer quadrant of the right breast and she ended up doing bilateral mastectomies.  She is currently on adjuvant tamoxifen.  The plan is to do tamoxifen for 5 years and then follow with an aromatase inhibitor for another 5 years.  With her lobular carcinoma of the right breast she had multiple foci and the tumor was ER/WV positive, HER-2 receptor negative.  She herself elected to do bilateral mastectomies.  She is tolerating tamoxifen well without any real major problem.  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      REVIEW OF SYSTEMS:  A 14-point comprehensive review of systems is otherwise unremarkable today.      MEDICATIONS:  As charted.      ALLERGIES:  AS CHARTED.      PHYSICAL EXAMINATION:   GENERAL:  Well-appearing lady in no acute distress.   VITAL SIGNS:  Stable.   HEENT:  Oropharynx clear, mucous membranes moist.   NECK:  Has no masses or goiter.   LYMPH:  There is no cervical, supraclavicular or infraclavicular adenopathy.   CHEST:  Clear to auscultation and percussion bilaterally.   HEART:  S1, S2 normal.  No added sounds, no murmurs.   BREASTS:  The patient is status post bilateral mastectomies, scars look good.  Right and left axilla are negative.   SKIN:  Has no rashes or lesions.   NEUROLOGIC:  The patient is alert and oriented x 3.   GASTROINTESTINAL:  Abdomen soft and nontender, no hepatosplenomegaly.   EXTREMITIES:  Legs are without tenderness or edema.      DATA REVIEW:  I have asked for some routine blood work today.      IMPRESSION:  A 47-year-old patient with a history of a multifocal lobular carcinoma of the upper outer quadrant of the right breast.  She had multiple foci of disease with negative lymph nodes, ER/WV positive, HER-2 receptor negative.   She is status post bilateral mastectomies and is now on active treatment with adjuvant tamoxifen.  Tamoxifen appears to be going well for her.  We have discussed her work situation.  She seems to be in a good place right now.  I will see her back . I will do routine blood work today and see her back in my clinic in 6 months.         JUDD HARMAN MD             D: 2018   T: 2018   MT: TRACI      Name:     DEBBIE FARIAS   MRN:      [id number]        Account:      [id number]   :      1970           Visit Date:   2018      Document: [id number]

## 2018-06-27 NOTE — MR AVS SNAPSHOT
After Visit Summary   6/27/2018    Myla Davis    MRN: [id number]           Patient Information     Date Of Birth          1970        Visit Information        Provider Department      6/27/2018 2:30 PM Faye Landeros MD [facility]        Today's Diagnoses     Malignant neoplasm of right breast in female, estrogen receptor positive, unspecified site of breast (H)    -  1      Care Instructions    RTC MD    6 months     Labs today   - drawn ljt              Follow-ups after your visit        Your next 10 appointments already scheduled     Dec 12, 2018  3:00 PM CST   Return Visit with Faye Landeros MD   [facility] ([facility])    [facility]  [address] [place]  [facility] [address]  [facility] [address] [phone]              Who to contact     If you have questions or need follow up information about today's clinic visit or your schedule please contact [facility] directly at [phone].  Normal or non-critical lab and imaging results will be communicated to you by MyChart, letter or phone within 4 business days after the clinic has received the results. If you do not hear from us within 7 days, please contact the clinic through MyChart or phone. If you have a critical or abnormal lab result, we will notify you by phone as soon as possible.  Submit refill requests through Seisquare or call your pharmacy and they will forward the refill request to us. Please allow 3 business days for your refill to be completed.          Additional Information About Your Visit        Care EveryWhere ID     This is your Care EveryWhere ID. This could be used by other organizations to access your [place] medical records  [id number]        Your Vitals Were     Pulse Temperature Respirations Height Pulse Oximetry BMI (Body Mass Index)    81 98.1  F (36.7  C) (Tympanic) 16 1.676 m (5'  "5.98\") 99% 23.97 kg/m2       Blood Pressure from Last 3 Encounters:   06/27/18 122/76   12/20/17 130/81   06/28/17 133/81    Weight from Last 3 Encounters:   06/27/18 67.3 kg (148 lb 6.4 oz)   12/20/17 69.3 kg (152 lb 12.8 oz)   06/28/17 66.2 kg (146 lb)              We Performed the Following     Ca27.29  breast tumor marker     CBC with platelets     Comprehensive metabolic panel (BMP + Alb, Alk Phos, ALT, AST, Total. Bili, TP)        Primary Care Provider Office Phone # Fax #    Malaika Daigle [phone]       [facility] [address] [facility] [address]        Equal Access to Services     [facility] MENJIVAR : Hadii aad ku hadasho Solesa, waaxda luqadaha, qaybta kaalmada adeegyada, samantha arreguin . So [facility] [phone].    ATENCIÓN: Si habla español, tiene a berg disposición servicios gratuitos de asistencia lingüística. Rodney al [phone].    We comply with applicable federal civil rights laws and [place] laws. We do not discriminate on the basis of race, color, national origin, age, disability, sex, sexual orientation, or gender identity.            Thank you!     Thank you for choosing [facility]  for your care. Our goal is always to provide you with excellent care. Hearing back from our patients is one way we can continue to improve our services. Please take a few minutes to complete the written survey that you may receive in the mail after your visit with us. Thank you!             Your Updated Medication List - Protect others around you: Learn how to safely use, store and throw away your medicines at www.disposemymeds.org.          This list is accurate as of 6/27/18  3:24 PM.  Always use your most recent med list.                   Brand Name Dispense Instructions for use Diagnosis    BENEFIBER DRINK MIX PO      Take 1 dose by mouth daily as needed        calcium-vitamin D 600-400 MG-UNIT per tablet    CALTRATE     " Take 1 tablet by mouth daily        guaiFENesin 600 MG 12 hr tablet    MUCINEX     Take 1,200 mg by mouth 2 times daily as needed        ibuprofen 600 MG tablet    ADVIL/MOTRIN    30 tablet    Take 1 tablet (600 mg) by mouth every 6 hours as needed for pain (mild)    Superficial foreign body of axilla, right, initial encounter       Multi-vitamin Tabs tablet      Take 1 tablet by mouth daily        OMEPRAZOLE PO      Take 40 mg by mouth every morning        SUDAFED PO      Take 30 mg by mouth 2 times daily as needed        tamoxifen 20 MG tablet    NOLVADEX    90 tablet    Take 1 tablet (20 mg) by mouth daily    Malignant neoplasm of central portion of right breast in female, estrogen receptor positive (H)       vitamin B complex with vitamin C Tabs tablet      Take 1 tablet by mouth daily        VITAMIN C PO      Take 500 mg by mouth daily        VITAMIN D (CHOLECALCIFEROL) PO      Take 1,000 Units by mouth daily        ZYRTEC PO      Take 10 mg by mouth daily PRN

## 2018-06-27 NOTE — LETTER
6/27/2018         RE: Myla Davis  [address] [address] [facility] [address]        Dear Colleague,    Thank you for referring your patient, Myla Davis, to the [facility]. Please see a copy of my visit note below.    Visit Date:   06/27/2018      HISTORY OF PRESENT ILLNESS:  Myla Davis is a 47-year-old patient who comes in today for interim followup.  She was diagnosed about 5 years ago with lobular carcinoma in the upper outer quadrant of the right breast and she ended up doing bilateral mastectomies.  She is currently on adjuvant tamoxifen.  The plan is to do tamoxifen for 5 years and then follow with an aromatase inhibitor for another 5 years.  With her lobular carcinoma of the right breast she had multiple foci and the tumor was ER/CT positive, HER-2 receptor negative.  She herself elected to do bilateral mastectomies.  She is tolerating tamoxifen well without any real major problem.  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      REVIEW OF SYSTEMS:  A 14-point comprehensive review of systems is otherwise unremarkable today.      MEDICATIONS:  As charted.      ALLERGIES:  AS CHARTED.      PHYSICAL EXAMINATION:   GENERAL:  Well-appearing lady in no acute distress.   VITAL SIGNS:  Stable.   HEENT:  Oropharynx clear, mucous membranes moist.   NECK:  Has no masses or goiter.   LYMPH:  There is no cervical, supraclavicular or infraclavicular adenopathy.   CHEST:  Clear to auscultation and percussion bilaterally.   HEART:  S1, S2 normal.  No added sounds, no murmurs.   BREASTS:  The patient is status post bilateral mastectomies, scars look good.  Right and left axilla are negative.   SKIN:  Has no rashes or lesions.   NEUROLOGIC:  The patient is alert and oriented x 3.   GASTROINTESTINAL:  Abdomen soft and nontender, no hepatosplenomegaly.   EXTREMITIES:  Legs are without tenderness or edema.      DATA REVIEW:  I have asked for some routine  blood work today.      IMPRESSION:  A 47-year-old patient with a history of a multifocal lobular carcinoma of the upper outer quadrant of the right breast.  She had multiple foci of disease with negative lymph nodes, ER/MO positive, HER-2 receptor negative.  She is status post bilateral mastectomies and is now on active treatment with adjuvant tamoxifen.  Tamoxifen appears to be going well for her.  We have discussed her work situation.  She seems to be in a good place right now.  I will see her back . I will do routine blood work today and see her back in my clinic in 6 months.         JUDD HARMAN MD             D: 2018   T: 2018   MT: TRACI      Name:     DEBBIE FARIAS   MRN:      [id number]        Account:      [id number]   :      1970           Visit Date:   2018      Document: [id number]       Again, thank you for allowing me to participate in the care of your patient.        Sincerely,        Judd Harman MD

## 2018-12-12 ENCOUNTER — ONCOLOGY VISIT (OUTPATIENT)
Dept: ONCOLOGY | Facility: CLINIC | Age: 48
End: 2018-12-12
Attending: INTERNAL MEDICINE
Payer: COMMERCIAL

## 2018-12-12 VITALS
TEMPERATURE: 98.2 F | HEART RATE: 72 BPM | BODY MASS INDEX: 25.58 KG/M2 | HEIGHT: 66 IN | OXYGEN SATURATION: 100 % | DIASTOLIC BLOOD PRESSURE: 78 MMHG | SYSTOLIC BLOOD PRESSURE: 116 MMHG | RESPIRATION RATE: 16 BRPM | WEIGHT: 159.2 LBS

## 2018-12-12 DIAGNOSIS — C50.911 MALIGNANT NEOPLASM OF RIGHT BREAST IN FEMALE, ESTROGEN RECEPTOR POSITIVE, UNSPECIFIED SITE OF BREAST (H): Primary | ICD-10-CM

## 2018-12-12 DIAGNOSIS — M85.80 OSTEOPENIA, UNSPECIFIED LOCATION: ICD-10-CM

## 2018-12-12 DIAGNOSIS — Z17.0 MALIGNANT NEOPLASM OF RIGHT BREAST IN FEMALE, ESTROGEN RECEPTOR POSITIVE, UNSPECIFIED SITE OF BREAST (H): Primary | ICD-10-CM

## 2018-12-12 PROCEDURE — 99214 OFFICE O/P EST MOD 30 MIN: CPT | Performed by: INTERNAL MEDICINE

## 2018-12-12 PROCEDURE — G0463 HOSPITAL OUTPT CLINIC VISIT: HCPCS

## 2018-12-12 RX ORDER — EXEMESTANE 25 MG/1
25 TABLET ORAL DAILY
Qty: 90 TABLET | Refills: 3 | Status: SHIPPED | OUTPATIENT
Start: 2018-12-12 | End: 2019-05-08 | Stop reason: SINTOL

## 2018-12-12 ASSESSMENT — MIFFLIN-ST. JEOR: SCORE: 1373.56

## 2018-12-12 ASSESSMENT — PAIN SCALES - GENERAL: PAINLEVEL: NO PAIN (0)

## 2018-12-12 NOTE — NURSING NOTE
"Oncology Rooming Note    December 12, 2018 3:16 PM   Myla Davis is a 48 year old female who presents for:    Chief Complaint   Patient presents with     Oncology Clinic Visit     Malignant neoplasm of right breast in female     Initial Vitals: /78   Pulse 72   Temp 98.2  F (36.8  C) (Tympanic)   Resp 16   Ht 1.676 m (5' 5.98\")   Wt 72.2 kg (159 lb 3.2 oz)   SpO2 100%   BMI 25.71 kg/m   Estimated body mass index is 25.71 kg/m  as calculated from the following:    Height as of this encounter: 1.676 m (5' 5.98\").    Weight as of this encounter: 72.2 kg (159 lb 3.2 oz). Body surface area is 1.83 meters squared.  No Pain (0) Comment: Data Unavailable   No LMP recorded. Patient is not currently having periods (Reason: UNKNOWN).  Allergies reviewed: Yes  Medications reviewed: Yes    Medications: Medication refills not needed today.  Pharmacy name entered into [facility]:    [facility] [address] [place] [address] - [address] [facility] AT [facility] [address]  [facility] [place], [place] - [address]    Clinical concerns: Follow Up    8 minutes for nursing intake (face to face time)     Sarah Flores CMA            DISCHARGE PLAN:  Next appointments: See patient instruction section  Departure Mode: Ambulatory  Accompanied by: self  0 minutes for nursing discharge (face to face time)   Sarah Flores CMA      "

## 2018-12-12 NOTE — LETTER
12/12/2018         RE: Myla Davis  [address] [facility] [address]        Dear Colleague,    Thank you for referring your patient, Myla Davis, to the [facility]ARE. Please see a copy of my visit note below.    Visit Date:   12/12/2018      Myla Davis is a 48-year-old patient with a history of lobular carcinoma of the right breast who comes in today for interim followup.  Myla had multiple foci of lobular carcinoma of the right breast.  She was initially treated neoadjuvantly with dose-dense Adriamycin, Cytoxan followed by dose-dense Taxol and then had definitive surgery.  At her definitive surgery, she still had residual multiple foci of tumor in the right breast and negative lymph nodes, ER/NY positive, HER-2 receptor negative.  She did do post-mastectomy radiation therapy and is now on adjuvant hormonal therapy.  Our plan is to continue hormonal therapy for a full 10 years.  She is just coming to the end of 5 years of tamoxifen.  I have discussed with her the clinical trials have shown benefit of continuing on hormonal therapy if switched to an aromatase inhibitor.  She is in agreement with that plan.  She is due to stop the tamoxifen in January and then we would start the aromatase inhibitor.  We have discussed the risks, benefits and side effects of the aromatase inhibitor and I have ordered today her bone density.  She will get that bone density as a baseline.      REVIEW OF SYSTEMS:  A 14-point comprehensive review of systems today is otherwise unremarkable.  She denies respiratory, cardiovascular, genitourinary, musculoskeletal or any other worrisome symptomatology.      MEDICATIONS:  As charted.      ALLERGIES:  As charted.      PHYSICAL EXAMINATION:   GENERAL:  She is well-appearing lady in no acute distress.   VITAL SIGNS:  Stable.   HEENT:  Oropharynx clear, mucous membranes moist.   NECK:  Has no masses or goiter.   LYMPH:  There is no cervical,  supraclavicular or infraclavicular adenopathy.   CHEST:  Clear to auscultation and percussion bilaterally.   HEART:  S1, S2 normal.  No added sounds or murmurs.   ABDOMEN:  Soft and nontender, no hepatosplenomegaly.   EXTREMITIES:  Legs are without tenderness or edema.   SKIN:  Has no rashes or lesions.      IMPRESSION:  This is a 48-year-old patient who has a diagnosis of lobular carcinoma of the right breast.  She was treated neoadjuvantly and did quite well, but she still had multiple foci of disease in the breast post-mastectomies.  For that reason, we are going to continue a full 10 years of adjuvant hormonal therapy.  I have discussed that with her today.  I have discussed the risks, benefits and side effects of aromatase inhibitors and I have ordered a baseline bone density.      Total consultation time today has been 30 minutes, the majority of time was spent on counseling and direction of patient care.         JUDD HARMAN MD             D: 2018   T: 2018   MT: JER      Name:     DEBBIE FARIAS   MRN:      [id number]        Account:      [id number]   :      1970           Visit Date:   2018      Document: [id number]       Again, thank you for allowing me to participate in the care of your patient.        Sincerely,        Judd Harman MD

## 2018-12-13 NOTE — PROGRESS NOTES
Visit Date:   12/12/2018      Myla Davis is a 48-year-old patient with a history of lobular carcinoma of the right breast who comes in today for interim followup.  Myla had multiple foci of lobular carcinoma of the right breast.  She was initially treated neoadjuvantly with dose-dense Adriamycin, Cytoxan followed by dose-dense Taxol and then had definitive surgery.  At her definitive surgery, she still had residual multiple foci of tumor in the right breast and negative lymph nodes, ER/MA positive, HER-2 receptor negative.  She did do post-mastectomy radiation therapy and is now on adjuvant hormonal therapy.  Our plan is to continue hormonal therapy for a full 10 years.  She is just coming to the end of 5 years of tamoxifen.  I have discussed with her the clinical trials have shown benefit of continuing on hormonal therapy if switched to an aromatase inhibitor.  She is in agreement with that plan.  She is due to stop the tamoxifen in January and then we would start the aromatase inhibitor.  We have discussed the risks, benefits and side effects of the aromatase inhibitor and I have ordered today her bone density.  She will get that bone density as a baseline.      REVIEW OF SYSTEMS:  A 14-point comprehensive review of systems today is otherwise unremarkable.  She denies respiratory, cardiovascular, genitourinary, musculoskeletal or any other worrisome symptomatology.      MEDICATIONS:  As charted.      ALLERGIES:  As charted.      PHYSICAL EXAMINATION:   GENERAL:  She is well-appearing lady in no acute distress.   VITAL SIGNS:  Stable.   HEENT:  Oropharynx clear, mucous membranes moist.   NECK:  Has no masses or goiter.   LYMPH:  There is no cervical, supraclavicular or infraclavicular adenopathy.   CHEST:  Clear to auscultation and percussion bilaterally.   HEART:  S1, S2 normal.  No added sounds or murmurs.   ABDOMEN:  Soft and nontender, no hepatosplenomegaly.   EXTREMITIES:  Legs are without tenderness  or edema.   SKIN:  Has no rashes or lesions.      IMPRESSION:  This is a 48-year-old patient who has a diagnosis of lobular carcinoma of the right breast.  She was treated neoadjuvantly and did quite well, but she still had multiple foci of disease in the breast post-mastectomies.  For that reason, we are going to continue a full 10 years of adjuvant hormonal therapy.  I have discussed that with her today.  I have discussed the risks, benefits and side effects of aromatase inhibitors and I have ordered a baseline bone density.      Total consultation time today has been 30 minutes, the majority of time was spent on counseling and direction of patient care.         JUDD HARMAN MD             D: 2018   T: 2018   MT: JER      Name:     DEBBIE FARIAS   MRN:      [id number]        Account:      [id number]   :      1970           Visit Date:   2018      Document: [id number]

## 2019-01-09 ENCOUNTER — ANCILLARY PROCEDURE (OUTPATIENT)
Dept: BONE DENSITY | Facility: CLINIC | Age: 49
End: 2019-01-09
Attending: INTERNAL MEDICINE
Payer: COMMERCIAL

## 2019-01-09 DIAGNOSIS — M85.80 OSTEOPENIA, UNSPECIFIED LOCATION: ICD-10-CM

## 2019-01-09 PROCEDURE — 77080 DXA BONE DENSITY AXIAL: CPT | Performed by: INTERNAL MEDICINE

## 2019-02-05 ENCOUNTER — TELEPHONE (OUTPATIENT)
Dept: ONCOLOGY | Facility: CLINIC | Age: 49
End: 2019-02-05

## 2019-02-05 NOTE — TELEPHONE ENCOUNTER
Received a faxed refill request from [facility] in [place] for tamoxifen.  Per chart review, it appears that pt has stopped taking tamoxifen, and is now taking exemestane.  Called pt. Pt confirms that this is correct--she has stopped the tamoxifen.  Called [facility] pharmacy to let them know that pt is no longer taking tamoxifen.  They state that they will remove the tamoxifen from pt's medication list.  No further action needed.  Sally Jacome, RN, BSN, OCN

## 2019-05-06 ENCOUNTER — PATIENT OUTREACH (OUTPATIENT)
Dept: ONCOLOGY | Facility: CLINIC | Age: 49
End: 2019-05-06

## 2019-05-06 NOTE — PROGRESS NOTES
Pt is calling. States that she completed her 5 years of tamoxifen in January, and started taking exemestane. Shortly after pt started taking the exemstane, she noticed increasing joint and muscle pain all over her body. Pt also noticed that her hot flashes were worse and more intense and she had excessive sweating.  Pt states that her joint and muscle pain has continued to worsen over the past few months. States that she stopped the exemestane 2 days ago and her symptoms have already started to improve. Pt is wondering if there is a different medication she can take?  Pt is requesting advice from Dr Landeros when she is back in the clinic on 5/8.  Per pt, ok to leave a message on her work number if unable to reach pt. Pharmacy has been updated.  Sally Jacome, RN, BSN, OCN

## 2019-05-08 NOTE — PROGRESS NOTES
Per Dr Landeros, pt should stop all hormonal therapy for the next 4-6 weeks.  After this time, pt should start taking tamoxifen again.  Pt was notified with these recommendations, and pt agrees with plan.  Patient verbalized understanding and agreement with plan.  Pt was instructed to call the clinic with any questions, concerns, or worsening symptoms.   Sally Jacome RN, BSN, OCN

## 2019-07-01 DIAGNOSIS — C50.111 MALIGNANT NEOPLASM OF CENTRAL PORTION OF RIGHT BREAST IN FEMALE, ESTROGEN RECEPTOR POSITIVE (H): ICD-10-CM

## 2019-07-01 DIAGNOSIS — Z17.0 MALIGNANT NEOPLASM OF CENTRAL PORTION OF RIGHT BREAST IN FEMALE, ESTROGEN RECEPTOR POSITIVE (H): ICD-10-CM

## 2019-07-01 RX ORDER — TAMOXIFEN CITRATE 20 MG/1
20 TABLET ORAL DAILY
Qty: 90 TABLET | Refills: 3 | Status: SHIPPED | OUTPATIENT
Start: 2019-07-01 | End: 2020-07-01

## 2019-07-01 NOTE — TELEPHONE ENCOUNTER
Signed Prescriptions:                        Disp   Refills    tamoxifen (NOLVADEX) 20 MG tablet          90 tab*3        Sig: Take 1 tablet (20 mg) by mouth daily  Authorizing Provider: RASTA ZAZUETA     Rx has been approved by BEAU Andrade.  Sally Jacome, RN, BSN, OCN

## 2019-07-01 NOTE — TELEPHONE ENCOUNTER
Pending Prescriptions:                       Disp   Refills    tamoxifen (NOLVADEX) 20 MG tablet         90 tab*3            Sig: Take 1 tablet (20 mg) by mouth daily         Last Written Prescription Date:  12/20/2017  Last Fill Quantity: 90,   # refills: 3  Last Office Visit: 12/12/2018  Future Office visit:  No appt scheduled at this time; due in December 2019    Per encounter from 5/6/2019, pt was having side effects from exemestane, and Dr Landeros recommended going back on tamoxifen after taking 4-6 weeks off from all hormonal therapy. Pt reports that she is doing much better since being back on tamoxifen. No side effects noted. Pt only has 1 tablet left of tamoxifen. No need to call pt back if refill is sent in today.    Routing refill request to provider for review/approval.  Sally Jacome, RN, BSN, OCN

## 2019-12-10 ENCOUNTER — ONCOLOGY VISIT (OUTPATIENT)
Dept: ONCOLOGY | Facility: CLINIC | Age: 49
End: 2019-12-10
Attending: INTERNAL MEDICINE
Payer: COMMERCIAL

## 2019-12-10 VITALS
HEART RATE: 75 BPM | SYSTOLIC BLOOD PRESSURE: 119 MMHG | BODY MASS INDEX: 24 KG/M2 | OXYGEN SATURATION: 100 % | WEIGHT: 148.6 LBS | DIASTOLIC BLOOD PRESSURE: 82 MMHG | TEMPERATURE: 97.7 F | RESPIRATION RATE: 16 BRPM

## 2019-12-10 DIAGNOSIS — C50.111 MALIGNANT NEOPLASM OF CENTRAL PORTION OF RIGHT BREAST IN FEMALE, ESTROGEN RECEPTOR POSITIVE (H): Primary | ICD-10-CM

## 2019-12-10 DIAGNOSIS — Z17.0 MALIGNANT NEOPLASM OF CENTRAL PORTION OF RIGHT BREAST IN FEMALE, ESTROGEN RECEPTOR POSITIVE (H): Primary | ICD-10-CM

## 2019-12-10 PROCEDURE — 99214 OFFICE O/P EST MOD 30 MIN: CPT | Performed by: INTERNAL MEDICINE

## 2019-12-10 PROCEDURE — G0463 HOSPITAL OUTPT CLINIC VISIT: HCPCS

## 2019-12-10 ASSESSMENT — PAIN SCALES - GENERAL: PAINLEVEL: NO PAIN (0)

## 2019-12-10 NOTE — PROGRESS NOTES
Visit Date:   12/10/2019      Myla Davis is 49 years old, has a history of lobular carcinoma of the right breast and comes in today for interim followup.  She had multiple foci of lobular carcinoma in the right breast and initially was treated neoadjuvantly with chemotherapy with Adriamycin and Cytoxan followed by Taxol.  At her definitive surgery, she still had residual multiple foci in the right breast with negative lymph nodes, ER/UT positive, HER-2 receptor negative.  She did do post-mastectomy radiation therapy and is now on adjuvant hormonal treatment.  We did 5 years of tamoxifen and then attempted to switch her over to an aromatase inhibitor, but she could not tolerate it, so she is going to complete out 10 years of adjuvant tamoxifen.  I have discussed with her today long-term toxicity of adjuvant tamoxifen.  She understands the toxicity and is willing to proceed.  She seems to be tolerating it well.      A 12-point comprehensive review of systems apart from what is outlined above is otherwise unremarkable.      MEDICATIONS AND ALLERGIES:  Outlined in the nursing records.      PHYSICAL EXAMINATION:   GENERAL:  She is well-appearing lady in no acute distress.   VITAL SIGNS:  Stable.   HEENT:  Oropharynx clear, mucous membranes moist.   NECK:  There are no masses or goiter.  There is no cervical, supraclavicular or infraclavicular adenopathy.   CHEST:  Clear to auscultation and percussion bilaterally.   HEART:  Sounds 1, 2 normal.  No added sounds or murmurs.   ABDOMEN:  Soft and nontender, no hepatosplenomegaly.   EXTREMITIES:  Legs without tenderness or edema.   SKIN:  No rashes or lesions.   BREASTS:  The patient is status post bilateral mastectomies with silicone implants.  She has some radiation changes on the right side.  Right and left axilla negative.      IMPRESSION:  This is a 49-year-old patient with a diagnosis of lobular carcinoma of the right breast.  She had multifocal disease and had  residual disease at her definitive surgery after neoadjuvant chemotherapy, so she is going to do a full 10 years of adjuvant hormonal treatment.  We did attempt to do an aromatase inhibitor, but she could not tolerate it.  We are doing tamoxifen for a full 10 years.  I have gone through with her today the risks of 10 years of treatment versus 5.  She understands and is willing to proceed.  I will see her back in a year.      CONSULTATION TIME:  25 minutes; the majority of time was spent on counseling and direction of patient care.         JUDD HARMAN MD             D: 12/10/2019   T: 12/10/2019   MT: JED      Name:     DEBBIE FARIAS   MRN:      [id number]        Account:      [id number]   :      1970           Visit Date:   12/10/2019      Document: [id number]

## 2019-12-10 NOTE — LETTER
12/10/2019         RE: Myla Davis  [address] [facility] [address]        Dear Colleague,    Thank you for referring your patient, Myla Davis, to the [facility] CANCER CLINIC. Please see a copy of my visit note below.    Visit Date:   12/10/2019      Myla Davis is 49 years old, has a history of lobular carcinoma of the right breast and comes in today for interim followup.  She had multiple foci of lobular carcinoma in the right breast and initially was treated neoadjuvantly with chemotherapy with Adriamycin and Cytoxan followed by Taxol.  At her definitive surgery, she still had residual multiple foci in the right breast with negative lymph nodes, ER/NV positive, HER-2 receptor negative.  She did do post-mastectomy radiation therapy and is now on adjuvant hormonal treatment.  We did 5 years of tamoxifen and then attempted to switch her over to an aromatase inhibitor, but she could not tolerate it, so she is going to complete out 10 years of adjuvant tamoxifen.  I have discussed with her today long-term toxicity of adjuvant tamoxifen.  She understands the toxicity and is willing to proceed.  She seems to be tolerating it well.      A 12-point comprehensive review of systems apart from what is outlined above is otherwise unremarkable.      MEDICATIONS AND ALLERGIES:  Outlined in the nursing records.      PHYSICAL EXAMINATION:   GENERAL:  She is well-appearing lady in no acute distress.   VITAL SIGNS:  Stable.   HEENT:  Oropharynx clear, mucous membranes moist.   NECK:  There are no masses or goiter.  There is no cervical, supraclavicular or infraclavicular adenopathy.   CHEST:  Clear to auscultation and percussion bilaterally.   HEART:  Sounds 1, 2 normal.  No added sounds or murmurs.   ABDOMEN:  Soft and nontender, no hepatosplenomegaly.   EXTREMITIES:  Legs without tenderness or edema.   SKIN:  No rashes or lesions.   BREASTS:  The patient is status post bilateral mastectomies with silicone  implants.  She has some radiation changes on the right side.  Right and left axilla negative.      IMPRESSION:  This is a 49-year-old patient with a diagnosis of lobular carcinoma of the right breast.  She had multifocal disease and had residual disease at her definitive surgery after neoadjuvant chemotherapy, so she is going to do a full 10 years of adjuvant hormonal treatment.  We did attempt to do an aromatase inhibitor, but she could not tolerate it.  We are doing tamoxifen for a full 10 years.  I have gone through with her today the risks of 10 years of treatment versus 5.  She understands and is willing to proceed.  I will see her back in a year.      CONSULTATION TIME:  25 minutes; the majority of time was spent on counseling and direction of patient care.         JUDD HARMAN MD             D: 12/10/2019   T: 12/10/2019   MT: JED      Name:     DEBBIE FARIAS   MRN:      [id number]        Account:      [id number]   :      1970           Visit Date:   12/10/2019      Document: [id number]       Again, thank you for allowing me to participate in the care of your patient.        Sincerely,        Judd Harman MD

## 2019-12-10 NOTE — PATIENT INSTRUCTIONS
Patient left without scheduling 1 year follow up with Dr Landeros. Deferred scheduling request until 9/2/20  JV 12/10/19

## 2019-12-10 NOTE — NURSING NOTE
"Oncology Rooming Note    December 10, 2019 10:13 AM   Myla Davis is a 49 year old female who presents for:    Chief Complaint   Patient presents with     Oncology Clinic Visit     Malignant neoplasm of right breast in female     Initial Vitals: /82   Pulse 75   Temp 97.7  F (36.5  C) (Tympanic)   Resp 16   Wt 67.4 kg (148 lb 9.6 oz)   SpO2 100%   BMI 24.00 kg/m   Estimated body mass index is 24 kg/m  as calculated from the following:    Height as of 12/12/18: 1.676 m (5' 5.98\").    Weight as of this encounter: 67.4 kg (148 lb 9.6 oz). Body surface area is 1.77 meters squared.  No Pain (0) Comment: Data Unavailable   No LMP recorded. (Menstrual status: UNKNOWN).  Allergies reviewed: Yes  Medications reviewed: Yes    Medications: Medication refills not needed today.  Pharmacy name entered into [facility]:    [id number] - [place], [place] - [facility] [address] [facility] [address]  [facility] [place], [place] - [facility] [address]    Clinical concerns: Follow Up      Sarah Flores CMA              "

## 2020-03-17 ENCOUNTER — HOSPITAL PATHOLOGY (OUTPATIENT)
Dept: OTHER | Facility: CLINIC | Age: 50
End: 2020-03-17

## 2020-03-18 LAB — COPATH REPORT: NORMAL

## 2020-07-01 DIAGNOSIS — C50.111 MALIGNANT NEOPLASM OF CENTRAL PORTION OF RIGHT BREAST IN FEMALE, ESTROGEN RECEPTOR POSITIVE (H): ICD-10-CM

## 2020-07-01 DIAGNOSIS — Z17.0 MALIGNANT NEOPLASM OF CENTRAL PORTION OF RIGHT BREAST IN FEMALE, ESTROGEN RECEPTOR POSITIVE (H): ICD-10-CM

## 2020-07-01 RX ORDER — TAMOXIFEN CITRATE 20 MG/1
TABLET ORAL
Qty: 90 TABLET | Refills: 3 | Status: SHIPPED | OUTPATIENT
Start: 2020-07-01 | End: 2021-02-17

## 2020-07-01 NOTE — TELEPHONE ENCOUNTER
Pending Prescriptions:                       Disp   Refills    tamoxifen (NOLVADEX) 20 MG tablet [Pharma*90 tab*3            Sig: TAKE 1 TABLET(20 MG) BY MOUTH DAILY         Last Written Prescription Date:  7/2/2019  Last Fill Quantity: 90,   # refills: 3  Last Office Visit: 12/10/2019  Future Office visit:       Routing refill request to Dr. Landeros  for review  Silvia Leach RN on 7/1/2020 at 1:02 PM

## 2020-07-02 NOTE — TELEPHONE ENCOUNTER
Signed Prescriptions:                        Disp   Refills    tamoxifen (NOLVADEX) 20 MG tablet          90 tab*3        Sig: TAKE 1 TABLET(20 MG) BY MOUTH DAILY  Authorizing Provider: JUDD HARMAN     Rx has been approved by Dr. Tigre Jacome, RN, BSN, OCN, CBCN

## 2021-01-12 ENCOUNTER — HOSPITAL PATHOLOGY (OUTPATIENT)
Dept: OTHER | Facility: CLINIC | Age: 51
End: 2021-01-12

## 2021-01-13 LAB — COPATH REPORT: NORMAL

## 2021-02-04 ENCOUNTER — MEDICAL CORRESPONDENCE (OUTPATIENT)
Dept: HEALTH INFORMATION MANAGEMENT | Facility: CLINIC | Age: 51
End: 2021-02-04

## 2021-02-04 DIAGNOSIS — Z01.818 PREOP GENERAL PHYSICAL EXAM: Primary | ICD-10-CM

## 2021-02-17 ENCOUNTER — ONCOLOGY VISIT (OUTPATIENT)
Dept: ONCOLOGY | Facility: CLINIC | Age: 51
End: 2021-02-17
Attending: INTERNAL MEDICINE
Payer: COMMERCIAL

## 2021-02-17 ENCOUNTER — HOSPITAL ENCOUNTER (OUTPATIENT)
Facility: CLINIC | Age: 51
Setting detail: SPECIMEN
Discharge: HOME | End: 2021-02-17
Attending: INTERNAL MEDICINE | Admitting: INTERNAL MEDICINE
Payer: COMMERCIAL

## 2021-02-17 VITALS
TEMPERATURE: 98.7 F | SYSTOLIC BLOOD PRESSURE: 135 MMHG | DIASTOLIC BLOOD PRESSURE: 83 MMHG | HEART RATE: 78 BPM | HEIGHT: 66 IN | RESPIRATION RATE: 16 BRPM | WEIGHT: 155 LBS | OXYGEN SATURATION: 100 % | BODY MASS INDEX: 24.91 KG/M2

## 2021-02-17 DIAGNOSIS — Z17.0 MALIGNANT NEOPLASM OF CENTRAL PORTION OF RIGHT BREAST IN FEMALE, ESTROGEN RECEPTOR POSITIVE (H): Primary | ICD-10-CM

## 2021-02-17 DIAGNOSIS — C50.111 MALIGNANT NEOPLASM OF CENTRAL PORTION OF RIGHT BREAST IN FEMALE, ESTROGEN RECEPTOR POSITIVE (H): Primary | ICD-10-CM

## 2021-02-17 LAB
ALBUMIN SERPL-MCNC: 3.9 G/DL (ref 3.4–5)
ALP SERPL-CCNC: 65 U/L (ref 40–150)
ALT SERPL W P-5'-P-CCNC: 24 U/L (ref 0–50)
ANION GAP SERPL CALCULATED.3IONS-SCNC: 6 MMOL/L (ref 3–14)
AST SERPL W P-5'-P-CCNC: 19 U/L (ref 0–45)
BILIRUB SERPL-MCNC: 0.3 MG/DL (ref 0.2–1.3)
BUN SERPL-MCNC: 14 MG/DL (ref 7–30)
CALCIUM SERPL-MCNC: 9.3 MG/DL (ref 8.5–10.1)
CANCER AG27-29 SERPL-ACNC: 15 U/ML (ref 0–39)
CHLORIDE SERPL-SCNC: 107 MMOL/L (ref 94–109)
CO2 SERPL-SCNC: 29 MMOL/L (ref 20–32)
CREAT SERPL-MCNC: 0.8 MG/DL (ref 0.52–1.04)
ERYTHROCYTE [DISTWIDTH] IN BLOOD BY AUTOMATED COUNT: 12.1 % (ref 10–15)
GFR SERPL CREATININE-BSD FRML MDRD: 87 ML/MIN/{1.73_M2}
GLUCOSE SERPL-MCNC: 96 MG/DL (ref 70–99)
HCT VFR BLD AUTO: 43.8 % (ref 35–47)
HGB BLD-MCNC: 13.9 G/DL (ref 11.7–15.7)
MCH RBC QN AUTO: 30.9 PG (ref 26.5–33)
MCHC RBC AUTO-ENTMCNC: 31.7 G/DL (ref 31.5–36.5)
MCV RBC AUTO: 97 FL (ref 78–100)
PLATELET # BLD AUTO: 213 10E9/L (ref 150–450)
POTASSIUM SERPL-SCNC: 4.5 MMOL/L (ref 3.4–5.3)
PROT SERPL-MCNC: 7.7 G/DL (ref 6.8–8.8)
RBC # BLD AUTO: 4.5 10E12/L (ref 3.8–5.2)
SODIUM SERPL-SCNC: 142 MMOL/L (ref 133–144)
WBC # BLD AUTO: 7 10E9/L (ref 4–11)

## 2021-02-17 PROCEDURE — 86300 IMMUNOASSAY TUMOR CA 15-3: CPT | Performed by: INTERNAL MEDICINE

## 2021-02-17 PROCEDURE — 99214 OFFICE O/P EST MOD 30 MIN: CPT | Performed by: INTERNAL MEDICINE

## 2021-02-17 PROCEDURE — 36415 COLL VENOUS BLD VENIPUNCTURE: CPT

## 2021-02-17 PROCEDURE — 85027 COMPLETE CBC AUTOMATED: CPT | Performed by: INTERNAL MEDICINE

## 2021-02-17 PROCEDURE — 80053 COMPREHEN METABOLIC PANEL: CPT | Performed by: INTERNAL MEDICINE

## 2021-02-17 PROCEDURE — G0463 HOSPITAL OUTPT CLINIC VISIT: HCPCS

## 2021-02-17 RX ORDER — TAMOXIFEN CITRATE 20 MG/1
20 TABLET ORAL DAILY
Qty: 90 TABLET | Refills: 3 | Status: SHIPPED | OUTPATIENT
Start: 2021-02-17 | End: 2022-02-17

## 2021-02-17 ASSESSMENT — MIFFLIN-ST. JEOR: SCORE: 1344.51

## 2021-02-17 ASSESSMENT — PAIN SCALES - GENERAL: PAINLEVEL: NO PAIN (0)

## 2021-02-17 NOTE — PROGRESS NOTES
Medical Assistant Note:  Myla Davis presents today for blood draw.    Patient seen by provider today: Yes: Dr. Landeros.   present during visit today: Not Applicable.    Concerns: Labs released in provider encounter.    Procedure:  Labs drawn    Post Assessment:  Labs drawn without difficulty: Yes.    Discharge Plan:  Departure Mode: Ambulatory.    Face to Face Time: 10 min.    Sarah Flores CMA

## 2021-02-17 NOTE — LETTER
2/17/2021         RE: Myla Davis  [address] [facility] [address]        Dear Colleague,    Thank you for referring your patient, Myla Davis, to the [facility] CANCER [facility]. Please see a copy of my visit note below.    Visit Date:   02/17/2021      HISTORY OF PRESENT ILLNESS:  Myla Davis is a 50-year-old postmenopausal patient who is here today for interim followup.      CHIEF COMPLAINT  History of lobular carcinoma of the right breast diagnosed at age 42 in 2013.      HISTORY OF PRESENTING COMPLAINT is a 50-year-old patient.  She switched from Dr. Love's clinic over to my clinic in 2016.  To recap, she was diagnosed with breast cancer at age 42 in 09/2013.  It was an invasive lobular carcinoma with associated LCIS, ER/IA positive, HER-2 receptor negative.  She had dose-dense chemotherapy with Adriamycin and Cytoxan followed by Taxol and then definitive surgery.  she had bilateral mastectomies.  At her definitive surgery, she had a small amount of invasive lobular carcinoma found in the right breast with 3 foci, each measuring 1-2 mm.  She had negative lymph nodes.  Her tumor was ER/IA positive, HER-2 receptor negative, and she had post-mastectomy radiation therapy.  She is now on tamoxifen.  She is on year 7 of the tamoxifen and is planning to continue the tamoxifen for a full 10 years.  She did try an aromatase inhibitor, but could not tolerate it.  Of note, the patient's sister was also diagnosed with breast cancer when she was 47, and she had a maternal aunt with breast cancer and a maternal grandmother with leukemia.      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      PAST MEDICAL HISTORY:  History of right-sided lobular carcinoma.  History of 1 ovary removed.       PAST SURGICAL HISTORY:  History of bilateral mastectomies with reconstruction.  History of oophorectomy.      MEDICATIONS AND ALLERGIES:  Outlined in the nursing records.      REVIEW OF SYSTEMS:  A 12-point comprehensive review of systems has been done with her today.  Overall, she feels very well.  She denies any respiratory, cardiovascular, genitourinary, musculoskeletal or neurological symptoms that are worrisome.  The patient has had some problems with uterine thickening and a uterus polyp.  She is planning to have a hysterectomy and have her second ovary removed on 03/18.  As long as she is having a hysterectomy, we will continue her tamoxifen.  We have discussed this at length today.      PHYSICAL EXAMINATION:   GENERAL:  She is well-appearing lady in no acute distress.   VITAL SIGNS:  Stable.   NECK:  Has no masses or goiter.  There is no cervical, supraclavicular or infraclavicular adenopathy.   CHEST:  Clear to auscultation and percussion bilaterally.   HEART:  Sounds 1, 2 normal.  No added sounds, no murmurs.   ABDOMEN:  Soft and nontender, no hepatosplenomegaly.   EXTREMITIES:  Legs without tenderness or edema.   SKIN:  Has no rashes or lesions.   BREASTS:  The patient is status post bilateral mastectomies with reconstruction.  The scars look good.  Right and left axillae are negative.   NEUROLOGIC:  She is alert and oriented x 3.      DATA REVIEW:  White cell count 7.0, hemoglobin 13.9 and platelets 213.  The rest of her labs are pending at time of dictation.      IMPRESSION AND PLAN:  A 50-year-old patient with a history of breast cancer diagnosed at age 42, the details of which are outlined above.  She had an excellent response to neoadjuvant chemotherapy, and she is planning to do tamoxifen for a full 10 years.  Tamoxifen is causing some problems with uterus thickening and a uterus polyp.  She is going to have a hysterectomy done on 03/18 with her gynecologist.  She has 1 ovary remaining, and she  will have that ovary removed, as she is now postmenopausal.  I plan to see her back in clinic in a year.  I have refilled her tamoxifen today.         JUDD HARMAN MD             D: 2021   T: 2021   MT: MEGHANA      Name:     DEBBIE FARIAS   MRN:      [id number]        Account:      [id number]   :      1970           Visit Date:   2021      Document: [id number]        Again, thank you for allowing me to participate in the care of your patient.        Sincerely,        Judd Harman MD

## 2021-02-17 NOTE — NURSING NOTE
"Oncology Rooming Note    February 17, 2021 3:29 PM   Myla Davis is a 50 year old female who presents for:    Chief Complaint   Patient presents with     Oncology Clinic Visit     Malignant neoplasm of other specified sites of female breast     Initial Vitals: /83   Pulse 78   Temp 98.7  F (37.1  C) (Tympanic)   Resp 16   Ht 1.676 m (5' 5.98\")   Wt 70.3 kg (155 lb)   SpO2 100%   BMI 25.03 kg/m   Estimated body mass index is 25.03 kg/m  as calculated from the following:    Height as of this encounter: 1.676 m (5' 5.98\").    Weight as of this encounter: 70.3 kg (155 lb). Body surface area is 1.81 meters squared.  No Pain (0) Comment: Data Unavailable   No LMP recorded. (Menstrual status: UNKNOWN).  Allergies reviewed: Yes  Medications reviewed: Yes    Medications: Medication refills not needed today.  Pharmacy name entered into [facility]:    [id number] - [place], [place] - [facility] [address] [facility] [address] [facility] [address]  [facility] [place], [place] - [facility] [address]    Clinical concerns: follow up       Kylie Saeed CMA            "

## 2021-02-18 NOTE — PROGRESS NOTES
Visit Date:   02/17/2021      HISTORY OF PRESENT ILLNESS:  Myla Davis is a 50-year-old postmenopausal patient who is here today for interim followup.      CHIEF COMPLAINT  History of lobular carcinoma of the right breast diagnosed at age 42 in 2013.      HISTORY OF PRESENTING COMPLAINT is a 50-year-old patient.  She switched from Dr. Love's clinic over to my clinic in 2016.  To recap, she was diagnosed with breast cancer at age 42 in 09/2013.  It was an invasive lobular carcinoma with associated LCIS, ER/UT positive, HER-2 receptor negative.  She had dose-dense chemotherapy with Adriamycin and Cytoxan followed by Taxol and then definitive surgery.  she had bilateral mastectomies.  At her definitive surgery, she had a small amount of invasive lobular carcinoma found in the right breast with 3 foci, each measuring 1-2 mm.  She had negative lymph nodes.  Her tumor was ER/UT positive, HER-2 receptor negative, and she had post-mastectomy radiation therapy.  She is now on tamoxifen.  She is on year 7 of the tamoxifen and is planning to continue the tamoxifen for a full 10 years.  She did try an aromatase inhibitor, but could not tolerate it.  Of note, the patient's sister was also diagnosed with breast cancer when she was 47, and she had a maternal aunt with breast cancer and a maternal grandmother with leukemia.      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      PAST MEDICAL HISTORY:  History of right-sided lobular carcinoma.  History of 1 ovary removed.      PAST SURGICAL HISTORY:  History of bilateral mastectomies with reconstruction.  History of oophorectomy.      MEDICATIONS AND ALLERGIES:  Outlined in the nursing records.      REVIEW OF SYSTEMS:  A 12-point comprehensive review of systems has been done  with her today.  Overall, she feels very well.  She denies any respiratory, cardiovascular, genitourinary, musculoskeletal or neurological symptoms that are worrisome.  The patient has had some problems with uterine thickening and a uterus polyp.  She is planning to have a hysterectomy and have her second ovary removed on 03/18.  As long as she is having a hysterectomy, we will continue her tamoxifen.  We have discussed this at length today.      PHYSICAL EXAMINATION:   GENERAL:  She is well-appearing lady in no acute distress.   VITAL SIGNS:  Stable.   NECK:  Has no masses or goiter.  There is no cervical, supraclavicular or infraclavicular adenopathy.   CHEST:  Clear to auscultation and percussion bilaterally.   HEART:  Sounds 1, 2 normal.  No added sounds, no murmurs.   ABDOMEN:  Soft and nontender, no hepatosplenomegaly.   EXTREMITIES:  Legs without tenderness or edema.   SKIN:  Has no rashes or lesions.   BREASTS:  The patient is status post bilateral mastectomies with reconstruction.  The scars look good.  Right and left axillae are negative.   NEUROLOGIC:  She is alert and oriented x 3.      DATA REVIEW:  White cell count 7.0, hemoglobin 13.9 and platelets 213.  The rest of her labs are pending at time of dictation.      IMPRESSION AND PLAN:  A 50-year-old patient with a history of breast cancer diagnosed at age 42, the details of which are outlined above.  She had an excellent response to neoadjuvant chemotherapy, and she is planning to do tamoxifen for a full 10 years.  Tamoxifen is causing some problems with uterus thickening and a uterus polyp.  She is going to have a hysterectomy done on 03/18 with her gynecologist.  She has 1 ovary remaining, and she will have that ovary removed, as she is now postmenopausal.  I plan to see her back in clinic in a year.  I have refilled her tamoxifen today.         JUDD HARAMN MD             D: 02/17/2021   T: 02/17/2021   MT: MEGHANA      Name:     DINORA  DEBBIE   MRN:      [id number]        Account:      [id number]   :      1970           Visit Date:   2021      Document: [id number]

## 2021-02-19 NOTE — PATIENT INSTRUCTIONS
Labs drawn on 2/18/21.  RTC in 1 year with Dr. Landeros.  Scheduling deferred until 10/17/21 per CE in Scheduling Department.  Tish Bhakta RN, BSN, OCN on 2/19/2021 at 12:56 PM

## 2021-03-02 DIAGNOSIS — Z11.59 ENCOUNTER FOR SCREENING FOR OTHER VIRAL DISEASES: ICD-10-CM

## 2021-03-14 ENCOUNTER — HOSPITAL ENCOUNTER (OUTPATIENT)
Dept: LAB | Facility: CLINIC | Age: 51
Discharge: HOME | End: 2021-03-14
Attending: OBSTETRICS & GYNECOLOGY | Admitting: OBSTETRICS & GYNECOLOGY
Payer: COMMERCIAL

## 2021-03-14 DIAGNOSIS — Z11.59 ENCOUNTER FOR SCREENING FOR OTHER VIRAL DISEASES: ICD-10-CM

## 2021-03-14 LAB
SARS-COV-2 RNA RESP QL NAA+PROBE: NORMAL
SPECIMEN SOURCE: NORMAL

## 2021-03-14 PROCEDURE — U0003 INFECTIOUS AGENT DETECTION BY NUCLEIC ACID (DNA OR RNA); SEVERE ACUTE RESPIRATORY SYNDROME CORONAVIRUS 2 (SARS-COV-2) (CORONAVIRUS DISEASE [COVID-19]), AMPLIFIED PROBE TECHNIQUE, MAKING USE OF HIGH THROUGHPUT TECHNOLOGIES AS DESCRIBED BY CMS-2020-01-R: HCPCS | Performed by: OBSTETRICS & GYNECOLOGY

## 2021-03-14 PROCEDURE — U0005 INFEC AGEN DETEC AMPLI PROBE: HCPCS | Performed by: OBSTETRICS & GYNECOLOGY

## 2021-03-15 LAB
LABORATORY COMMENT REPORT: NORMAL
SARS-COV-2 RNA RESP QL NAA+PROBE: NEGATIVE
SPECIMEN SOURCE: NORMAL

## 2021-03-17 ENCOUNTER — HOSPITAL ENCOUNTER (OUTPATIENT)
Dept: LAB | Facility: CLINIC | Age: 51
Discharge: HOME | End: 2021-03-17
Attending: OBSTETRICS & GYNECOLOGY | Admitting: OBSTETRICS & GYNECOLOGY
Payer: COMMERCIAL

## 2021-03-17 DIAGNOSIS — Z01.818 PREOP GENERAL PHYSICAL EXAM: ICD-10-CM

## 2021-03-17 LAB
ABO + RH BLD: NORMAL
ABO + RH BLD: NORMAL
BLD GP AB SCN SERPL QL: NORMAL
BLOOD BANK CMNT PATIENT-IMP: NORMAL
SPECIMEN EXP DATE BLD: NORMAL

## 2021-03-17 PROCEDURE — 36415 COLL VENOUS BLD VENIPUNCTURE: CPT | Performed by: OBSTETRICS & GYNECOLOGY

## 2021-03-17 PROCEDURE — 86850 RBC ANTIBODY SCREEN: CPT | Performed by: OBSTETRICS & GYNECOLOGY

## 2021-03-17 PROCEDURE — 86900 BLOOD TYPING SEROLOGIC ABO: CPT | Performed by: OBSTETRICS & GYNECOLOGY

## 2021-03-17 PROCEDURE — 86901 BLOOD TYPING SEROLOGIC RH(D): CPT | Performed by: OBSTETRICS & GYNECOLOGY

## 2021-03-17 RX ORDER — ALBUTEROL SULFATE 90 UG/1
2 AEROSOL, METERED RESPIRATORY (INHALATION) 4 TIMES DAILY PRN
COMMUNITY

## 2021-03-17 RX ORDER — FLUTICASONE PROPIONATE 50 MCG
1 SPRAY, SUSPENSION (ML) NASAL DAILY
COMMUNITY

## 2021-03-17 RX ORDER — MONTELUKAST SODIUM 10 MG/1
10 TABLET ORAL AT BEDTIME
COMMUNITY

## 2021-03-17 RX ORDER — ASPIRIN 81 MG/1
81 TABLET ORAL DAILY
COMMUNITY

## 2021-03-18 ENCOUNTER — ANESTHESIA EVENT (OUTPATIENT)
Dept: SURGERY | Facility: CLINIC | Age: 51
End: 2021-03-18
Payer: COMMERCIAL

## 2021-03-18 ENCOUNTER — HOSPITAL ENCOUNTER (OUTPATIENT)
Facility: CLINIC | Age: 51
Discharge: HOME | End: 2021-03-18
Attending: OBSTETRICS & GYNECOLOGY | Admitting: OBSTETRICS & GYNECOLOGY
Payer: COMMERCIAL

## 2021-03-18 ENCOUNTER — ANESTHESIA (OUTPATIENT)
Dept: SURGERY | Facility: CLINIC | Age: 51
End: 2021-03-18
Payer: COMMERCIAL

## 2021-03-18 VITALS
RESPIRATION RATE: 12 BRPM | WEIGHT: 155.8 LBS | HEART RATE: 71 BPM | TEMPERATURE: 98.2 F | HEIGHT: 65 IN | SYSTOLIC BLOOD PRESSURE: 119 MMHG | DIASTOLIC BLOOD PRESSURE: 82 MMHG | OXYGEN SATURATION: 98 % | BODY MASS INDEX: 25.96 KG/M2

## 2021-03-18 DIAGNOSIS — Z90.710 S/P LAPAROSCOPIC HYSTERECTOMY: Primary | ICD-10-CM

## 2021-03-18 PROCEDURE — 250N000009 HC RX 250: Performed by: ANESTHESIOLOGY

## 2021-03-18 PROCEDURE — 250N000009 HC RX 250: Performed by: NURSE ANESTHETIST, CERTIFIED REGISTERED

## 2021-03-18 PROCEDURE — 250N000013 HC RX MED GY IP 250 OP 250 PS 637: Performed by: ANESTHESIOLOGY

## 2021-03-18 PROCEDURE — 250N000011 HC RX IP 250 OP 636: Performed by: OBSTETRICS & GYNECOLOGY

## 2021-03-18 PROCEDURE — 250N000009 HC RX 250: Performed by: OBSTETRICS & GYNECOLOGY

## 2021-03-18 PROCEDURE — 88307 TISSUE EXAM BY PATHOLOGIST: CPT | Mod: TC | Performed by: OBSTETRICS & GYNECOLOGY

## 2021-03-18 PROCEDURE — 86850 RBC ANTIBODY SCREEN: CPT | Performed by: OBSTETRICS & GYNECOLOGY

## 2021-03-18 PROCEDURE — 88307 TISSUE EXAM BY PATHOLOGIST: CPT | Mod: 26 | Performed by: PATHOLOGY

## 2021-03-18 PROCEDURE — 86900 BLOOD TYPING SEROLOGIC ABO: CPT | Performed by: OBSTETRICS & GYNECOLOGY

## 2021-03-18 PROCEDURE — 250N000013 HC RX MED GY IP 250 OP 250 PS 637: Performed by: OBSTETRICS & GYNECOLOGY

## 2021-03-18 PROCEDURE — 999N000141 HC STATISTIC PRE-PROCEDURE NURSING ASSESSMENT: Performed by: OBSTETRICS & GYNECOLOGY

## 2021-03-18 PROCEDURE — 250N000025 HC SEVOFLURANE, PER MIN: Performed by: OBSTETRICS & GYNECOLOGY

## 2021-03-18 PROCEDURE — 272N000001 HC OR GENERAL SUPPLY STERILE: Performed by: OBSTETRICS & GYNECOLOGY

## 2021-03-18 PROCEDURE — 258N000001 HC RX 258: Performed by: OBSTETRICS & GYNECOLOGY

## 2021-03-18 PROCEDURE — 360N000080 HC SURGERY LEVEL 7, PER MIN: Performed by: OBSTETRICS & GYNECOLOGY

## 2021-03-18 PROCEDURE — 250N000011 HC RX IP 250 OP 636: Performed by: NURSE ANESTHETIST, CERTIFIED REGISTERED

## 2021-03-18 PROCEDURE — 370N000017 HC ANESTHESIA TECHNICAL FEE, PER MIN: Performed by: OBSTETRICS & GYNECOLOGY

## 2021-03-18 PROCEDURE — 258N000003 HC RX IP 258 OP 636: Performed by: OBSTETRICS & GYNECOLOGY

## 2021-03-18 PROCEDURE — 258N000003 HC RX IP 258 OP 636: Performed by: NURSE ANESTHETIST, CERTIFIED REGISTERED

## 2021-03-18 PROCEDURE — 710N000012 HC RECOVERY PHASE 2, PER MINUTE: Performed by: OBSTETRICS & GYNECOLOGY

## 2021-03-18 PROCEDURE — 250N000011 HC RX IP 250 OP 636: Performed by: ANESTHESIOLOGY

## 2021-03-18 PROCEDURE — 710N000009 HC RECOVERY PHASE 1, LEVEL 1, PER MIN: Performed by: OBSTETRICS & GYNECOLOGY

## 2021-03-18 RX ORDER — FENTANYL CITRATE 50 UG/ML
INJECTION, SOLUTION INTRAMUSCULAR; INTRAVENOUS PRN
Status: DISCONTINUED | OUTPATIENT
Start: 2021-03-18 | End: 2021-03-18

## 2021-03-18 RX ORDER — HYDROMORPHONE HYDROCHLORIDE 2 MG/1
2 TABLET ORAL
Status: DISCONTINUED | OUTPATIENT
Start: 2021-03-18 | End: 2021-03-18 | Stop reason: HOSPADM

## 2021-03-18 RX ORDER — LIDOCAINE HYDROCHLORIDE 20 MG/ML
INJECTION, SOLUTION INFILTRATION; PERINEURAL PRN
Status: DISCONTINUED | OUTPATIENT
Start: 2021-03-18 | End: 2021-03-18

## 2021-03-18 RX ORDER — ONDANSETRON 2 MG/ML
INJECTION INTRAMUSCULAR; INTRAVENOUS PRN
Status: DISCONTINUED | OUTPATIENT
Start: 2021-03-18 | End: 2021-03-18

## 2021-03-18 RX ORDER — SODIUM CHLORIDE, SODIUM LACTATE, POTASSIUM CHLORIDE, CALCIUM CHLORIDE 600; 310; 30; 20 MG/100ML; MG/100ML; MG/100ML; MG/100ML
INJECTION, SOLUTION INTRAVENOUS CONTINUOUS
Status: DISCONTINUED | OUTPATIENT
Start: 2021-03-18 | End: 2021-03-18 | Stop reason: HOSPADM

## 2021-03-18 RX ORDER — NALOXONE HYDROCHLORIDE 0.4 MG/ML
0.4 INJECTION, SOLUTION INTRAMUSCULAR; INTRAVENOUS; SUBCUTANEOUS
Status: CANCELLED | OUTPATIENT
Start: 2021-03-18 | End: 2021-03-19

## 2021-03-18 RX ORDER — ACETAMINOPHEN 325 MG/1
975 TABLET ORAL ONCE
Status: DISCONTINUED | OUTPATIENT
Start: 2021-03-18 | End: 2021-03-18 | Stop reason: HOSPADM

## 2021-03-18 RX ORDER — OXYCODONE HYDROCHLORIDE 5 MG/1
5 TABLET ORAL EVERY 4 HOURS PRN
Qty: 10 TABLET | Refills: 0 | Status: SHIPPED | OUTPATIENT
Start: 2021-03-18 | End: 2021-03-21

## 2021-03-18 RX ORDER — MAGNESIUM HYDROXIDE 1200 MG/15ML
LIQUID ORAL PRN
Status: DISCONTINUED | OUTPATIENT
Start: 2021-03-18 | End: 2021-03-18 | Stop reason: HOSPADM

## 2021-03-18 RX ORDER — DEXAMETHASONE SODIUM PHOSPHATE 4 MG/ML
INJECTION, SOLUTION INTRA-ARTICULAR; INTRALESIONAL; INTRAMUSCULAR; INTRAVENOUS; SOFT TISSUE PRN
Status: DISCONTINUED | OUTPATIENT
Start: 2021-03-18 | End: 2021-03-18

## 2021-03-18 RX ORDER — SODIUM CHLORIDE, SODIUM LACTATE, POTASSIUM CHLORIDE, CALCIUM CHLORIDE 600; 310; 30; 20 MG/100ML; MG/100ML; MG/100ML; MG/100ML
INJECTION, SOLUTION INTRAVENOUS CONTINUOUS
Status: CANCELLED | OUTPATIENT
Start: 2021-03-18

## 2021-03-18 RX ORDER — IBUPROFEN 400 MG/1
800 TABLET, FILM COATED ORAL ONCE
Status: DISCONTINUED | OUTPATIENT
Start: 2021-03-18 | End: 2021-03-18 | Stop reason: HOSPADM

## 2021-03-18 RX ORDER — CLINDAMYCIN PHOSPHATE 900 MG/50ML
900 INJECTION, SOLUTION INTRAVENOUS SEE ADMIN INSTRUCTIONS
Status: DISCONTINUED | OUTPATIENT
Start: 2021-03-18 | End: 2021-03-18

## 2021-03-18 RX ORDER — NALOXONE HYDROCHLORIDE 0.4 MG/ML
0.2 INJECTION, SOLUTION INTRAMUSCULAR; INTRAVENOUS; SUBCUTANEOUS
Status: DISCONTINUED | OUTPATIENT
Start: 2021-03-18 | End: 2021-03-18 | Stop reason: HOSPADM

## 2021-03-18 RX ORDER — VECURONIUM BROMIDE 1 MG/ML
INJECTION, POWDER, LYOPHILIZED, FOR SOLUTION INTRAVENOUS PRN
Status: DISCONTINUED | OUTPATIENT
Start: 2021-03-18 | End: 2021-03-18

## 2021-03-18 RX ORDER — NALOXONE HYDROCHLORIDE 0.4 MG/ML
0.4 INJECTION, SOLUTION INTRAMUSCULAR; INTRAVENOUS; SUBCUTANEOUS
Status: DISCONTINUED | OUTPATIENT
Start: 2021-03-18 | End: 2021-03-18 | Stop reason: HOSPADM

## 2021-03-18 RX ORDER — SCOLOPAMINE TRANSDERMAL SYSTEM 1 MG/1
1 PATCH, EXTENDED RELEASE TRANSDERMAL ONCE
Status: DISCONTINUED | OUTPATIENT
Start: 2021-03-18 | End: 2021-03-18 | Stop reason: HOSPADM

## 2021-03-18 RX ORDER — ONDANSETRON 4 MG/1
4 TABLET, ORALLY DISINTEGRATING ORAL EVERY 30 MIN PRN
Status: CANCELLED | OUTPATIENT
Start: 2021-03-18

## 2021-03-18 RX ORDER — GLYCOPYRROLATE 0.2 MG/ML
INJECTION, SOLUTION INTRAMUSCULAR; INTRAVENOUS PRN
Status: DISCONTINUED | OUTPATIENT
Start: 2021-03-18 | End: 2021-03-18

## 2021-03-18 RX ORDER — HYDROMORPHONE HYDROCHLORIDE 2 MG/1
2 TABLET ORAL EVERY 4 HOURS PRN
Qty: 10 TABLET | Refills: 0 | Status: SHIPPED | OUTPATIENT
Start: 2021-03-18 | End: 2021-03-18

## 2021-03-18 RX ORDER — NALOXONE HYDROCHLORIDE 0.4 MG/ML
0.2 INJECTION, SOLUTION INTRAMUSCULAR; INTRAVENOUS; SUBCUTANEOUS
Status: CANCELLED | OUTPATIENT
Start: 2021-03-18 | End: 2021-03-19

## 2021-03-18 RX ORDER — CLINDAMYCIN PHOSPHATE 900 MG/50ML
900 INJECTION, SOLUTION INTRAVENOUS
Status: COMPLETED | OUTPATIENT
Start: 2021-03-18 | End: 2021-03-18

## 2021-03-18 RX ORDER — NEOSTIGMINE METHYLSULFATE 1 MG/ML
VIAL (ML) INJECTION PRN
Status: DISCONTINUED | OUTPATIENT
Start: 2021-03-18 | End: 2021-03-18

## 2021-03-18 RX ORDER — HYDROMORPHONE HYDROCHLORIDE 1 MG/ML
.3-.5 INJECTION, SOLUTION INTRAMUSCULAR; INTRAVENOUS; SUBCUTANEOUS EVERY 10 MIN PRN
Status: DISCONTINUED | OUTPATIENT
Start: 2021-03-18 | End: 2021-03-18 | Stop reason: HOSPADM

## 2021-03-18 RX ORDER — HYDROMORPHONE HYDROCHLORIDE 2 MG/1
2 TABLET ORAL ONCE
Status: COMPLETED | OUTPATIENT
Start: 2021-03-18 | End: 2021-03-18

## 2021-03-18 RX ORDER — ONDANSETRON 2 MG/ML
4 INJECTION INTRAMUSCULAR; INTRAVENOUS EVERY 30 MIN PRN
Status: CANCELLED | OUTPATIENT
Start: 2021-03-18

## 2021-03-18 RX ORDER — SODIUM CHLORIDE, SODIUM LACTATE, POTASSIUM CHLORIDE, CALCIUM CHLORIDE 600; 310; 30; 20 MG/100ML; MG/100ML; MG/100ML; MG/100ML
INJECTION, SOLUTION INTRAVENOUS CONTINUOUS PRN
Status: DISCONTINUED | OUTPATIENT
Start: 2021-03-18 | End: 2021-03-18

## 2021-03-18 RX ORDER — AMOXICILLIN 250 MG
1-2 CAPSULE ORAL 2 TIMES DAILY
Qty: 30 TABLET | Refills: 0 | COMMUNITY
Start: 2021-03-18 | End: 2024-02-21

## 2021-03-18 RX ORDER — IBUPROFEN 800 MG/1
800 TABLET, FILM COATED ORAL EVERY 6 HOURS PRN
Qty: 30 TABLET | Refills: 0 | COMMUNITY
Start: 2021-03-18 | End: 2024-02-21

## 2021-03-18 RX ORDER — KETOROLAC TROMETHAMINE 30 MG/ML
INJECTION, SOLUTION INTRAMUSCULAR; INTRAVENOUS PRN
Status: DISCONTINUED | OUTPATIENT
Start: 2021-03-18 | End: 2021-03-18

## 2021-03-18 RX ORDER — ACETAMINOPHEN 325 MG/1
975 TABLET ORAL EVERY 6 HOURS PRN
Qty: 50 TABLET | Refills: 0 | COMMUNITY
Start: 2021-03-18 | End: 2024-02-21

## 2021-03-18 RX ORDER — BUPIVACAINE HYDROCHLORIDE 2.5 MG/ML
INJECTION, SOLUTION INFILTRATION; PERINEURAL PRN
Status: DISCONTINUED | OUTPATIENT
Start: 2021-03-18 | End: 2021-03-18 | Stop reason: HOSPADM

## 2021-03-18 RX ORDER — PROPOFOL 10 MG/ML
INJECTION, EMULSION INTRAVENOUS PRN
Status: DISCONTINUED | OUTPATIENT
Start: 2021-03-18 | End: 2021-03-18

## 2021-03-18 RX ORDER — ONDANSETRON 2 MG/ML
4 INJECTION INTRAMUSCULAR; INTRAVENOUS EVERY 30 MIN PRN
Status: DISCONTINUED | OUTPATIENT
Start: 2021-03-18 | End: 2021-03-18 | Stop reason: HOSPADM

## 2021-03-18 RX ORDER — ONDANSETRON 4 MG/1
4 TABLET, ORALLY DISINTEGRATING ORAL EVERY 30 MIN PRN
Status: DISCONTINUED | OUTPATIENT
Start: 2021-03-18 | End: 2021-03-18 | Stop reason: HOSPADM

## 2021-03-18 RX ORDER — FENTANYL CITRATE 50 UG/ML
25-50 INJECTION, SOLUTION INTRAMUSCULAR; INTRAVENOUS EVERY 5 MIN PRN
Status: DISCONTINUED | OUTPATIENT
Start: 2021-03-18 | End: 2021-03-18 | Stop reason: HOSPADM

## 2021-03-18 RX ORDER — ACETAMINOPHEN 325 MG/1
975 TABLET ORAL ONCE
Status: COMPLETED | OUTPATIENT
Start: 2021-03-18 | End: 2021-03-18

## 2021-03-18 RX ORDER — PROPOFOL 10 MG/ML
INJECTION, EMULSION INTRAVENOUS CONTINUOUS PRN
Status: DISCONTINUED | OUTPATIENT
Start: 2021-03-18 | End: 2021-03-18

## 2021-03-18 RX ADMIN — NEOSTIGMINE METHYLSULFATE 4 MG: 1 INJECTION, SOLUTION INTRAVENOUS at 10:55

## 2021-03-18 RX ADMIN — FENTANYL CITRATE 50 MCG: 0.05 INJECTION, SOLUTION INTRAMUSCULAR; INTRAVENOUS at 11:30

## 2021-03-18 RX ADMIN — ONDANSETRON 4 MG: 2 INJECTION INTRAMUSCULAR; INTRAVENOUS at 10:47

## 2021-03-18 RX ADMIN — PROPOFOL 200 MG: 10 INJECTION, EMULSION INTRAVENOUS at 09:07

## 2021-03-18 RX ADMIN — MIDAZOLAM 2 MG: 1 INJECTION INTRAMUSCULAR; INTRAVENOUS at 09:02

## 2021-03-18 RX ADMIN — HYDROMORPHONE HYDROCHLORIDE 2 MG: 2 TABLET ORAL at 12:24

## 2021-03-18 RX ADMIN — ONDANSETRON 4 MG: 2 INJECTION INTRAMUSCULAR; INTRAVENOUS at 13:18

## 2021-03-18 RX ADMIN — HYDROMORPHONE HYDROCHLORIDE 0.5 MG: 1 INJECTION, SOLUTION INTRAMUSCULAR; INTRAVENOUS; SUBCUTANEOUS at 11:16

## 2021-03-18 RX ADMIN — LIDOCAINE HYDROCHLORIDE 100 MG: 20 INJECTION, SOLUTION INFILTRATION; PERINEURAL at 09:07

## 2021-03-18 RX ADMIN — PROPOFOL 75 MCG/KG/MIN: 10 INJECTION, EMULSION INTRAVENOUS at 09:07

## 2021-03-18 RX ADMIN — SODIUM CHLORIDE, POTASSIUM CHLORIDE, SODIUM LACTATE AND CALCIUM CHLORIDE: 600; 310; 30; 20 INJECTION, SOLUTION INTRAVENOUS at 09:02

## 2021-03-18 RX ADMIN — PROCHLORPERAZINE EDISYLATE 5 MG: 5 INJECTION INTRAMUSCULAR; INTRAVENOUS at 14:38

## 2021-03-18 RX ADMIN — SCOPOLAMINE 1 PATCH: 1 PATCH TRANSDERMAL at 07:50

## 2021-03-18 RX ADMIN — GLYCOPYRROLATE 0.2 MG: 0.2 INJECTION, SOLUTION INTRAMUSCULAR; INTRAVENOUS at 09:48

## 2021-03-18 RX ADMIN — HYDROMORPHONE HYDROCHLORIDE 0.5 MG: 1 INJECTION, SOLUTION INTRAMUSCULAR; INTRAVENOUS; SUBCUTANEOUS at 11:54

## 2021-03-18 RX ADMIN — GLYCOPYRROLATE 0.6 MG: 0.2 INJECTION, SOLUTION INTRAMUSCULAR; INTRAVENOUS at 10:55

## 2021-03-18 RX ADMIN — HYDROMORPHONE HYDROCHLORIDE 0.5 MG: 1 INJECTION, SOLUTION INTRAMUSCULAR; INTRAVENOUS; SUBCUTANEOUS at 09:56

## 2021-03-18 RX ADMIN — FENTANYL CITRATE 100 MCG: 50 INJECTION, SOLUTION INTRAMUSCULAR; INTRAVENOUS at 09:07

## 2021-03-18 RX ADMIN — DEXAMETHASONE SODIUM PHOSPHATE 4 MG: 4 INJECTION, SOLUTION INTRA-ARTICULAR; INTRALESIONAL; INTRAMUSCULAR; INTRAVENOUS; SOFT TISSUE at 09:16

## 2021-03-18 RX ADMIN — KETOROLAC TROMETHAMINE 30 MG: 30 INJECTION, SOLUTION INTRAMUSCULAR at 10:51

## 2021-03-18 RX ADMIN — ACETAMINOPHEN 975 MG: 325 TABLET, FILM COATED ORAL at 07:50

## 2021-03-18 RX ADMIN — CLINDAMYCIN PHOSPHATE 900 MG: 900 INJECTION, SOLUTION INTRAVENOUS at 09:16

## 2021-03-18 RX ADMIN — ROCURONIUM BROMIDE 50 MG: 10 INJECTION INTRAVENOUS at 09:07

## 2021-03-18 RX ADMIN — GENTAMICIN SULFATE 350 MG: 40 INJECTION, SOLUTION INTRAMUSCULAR; INTRAVENOUS at 09:20

## 2021-03-18 RX ADMIN — VECURONIUM BROMIDE 3 MG: 1 INJECTION, POWDER, LYOPHILIZED, FOR SOLUTION INTRAVENOUS at 09:55

## 2021-03-18 RX ADMIN — PHENYLEPHRINE HYDROCHLORIDE 100 MCG: 10 INJECTION INTRAVENOUS at 09:46

## 2021-03-18 ASSESSMENT — MIFFLIN-ST. JEOR: SCORE: 1332.58

## 2021-03-18 NOTE — ANESTHESIA PREPROCEDURE EVALUATION
Anesthesia Pre-Procedure Evaluation    Patient: Myla Davis   MRN: [id number] : 1970        Preoperative Diagnosis: Postmenopausal bleeding [N95.0]   Procedure : Procedure(s):  ROBOTIC-ASSISTED TOTAL LAPAROSCOPIC HYSTERECTOMY, BILATERAL SALPINGECTOMY, RIGHT OOPHORECTOMY, CYSTOSCOPY     Past Medical History:   Diagnosis Date     Abnormal maternal glucose tolerance, complicating pregnancy, childbirth, or the puerperium, unspecified as to episode of care ,     In both pregnancies, not on insulin     Breast cancer (H)      Cancer (H)      Gastro-oesophageal reflux disease      Mild intermittent asthma     excercise induced     Nasal/sinus dis NEC      PONV (postoperative nausea and vomiting)       Past Surgical History:   Procedure Laterality Date     ------------OTHER-------------  2006    left ovary removed      BIOPSY BREAST SEED LOCALIZATION  2014    Procedure: BIOPSY BREAST SEED LOCALIZATION;  Right Axillary Excisional biopsy with seed localization;  Surgeon: Sarah Welch MD;  Location: [facility] OR     GYN SURGERY       INSERT PORT VASCULAR ACCESS  2013    Procedure: INSERT PORT VASCULAR ACCESS;  Insertion of power port into Left external jugular;  Surgeon: Sarah Welch MD;  Location: [facility] OR     MASTECTOMY SIMPLE BILATERAL, SENTINEL NODE BILATERAL, COMBINED  2014    Procedure: COMBINED MASTECTOMY SIMPLE BILATERAL, SENTINEL NODE BILATERAL;  Bilateral Mastectomy, Right Davin Node Biopsy, Removal of power port, Placement of Bilateral Tissue Expanders ;  Surgeon: Sarah Welch MD;  Location: [facility] OR     OVARY SURGERY      left oopherectomy     PROCURE GRAFT FAT Bilateral 2014    Procedure: PROCURE GRAFT FAT;  Surgeon: Jaida Hernandes MD;  Location: [facility] OR     RECONSTRUCT BREAST BILATERAL, IMPLANT PROSTHESIS BILATERAL, COMBINED Bilateral 2014    Procedure: COMBINED RECONSTRUCT BREAST BILATERAL, IMPLANT PROSTHESIS BILATERAL;  Surgeon:  Jaida Hernandes MD;  Location: [facility] OR     RECONSTRUCT BREAST, INSERT TISSUE EXPANDER BILATERAL, COMBINED  2/4/2014    Procedure: COMBINED RECONSTRUCT BREAST, INSERT TISSUE EXPANDER BILATERAL;;  Surgeon: Jaida Hernandes MD;  Location: [facility] OR      Allergies   Allergen Reactions     Vancomycin Rash     Rash itching hives     Percocet [Oxycodone-Acetaminophen] Itching      Social History     Tobacco Use     Smoking status: Never Smoker     Smokeless tobacco: Never Used   Substance Use Topics     Alcohol use: Yes     Comment: 2 weekly      Wt Readings from Last 1 Encounters:   03/18/21 70.7 kg (155 lb 12.8 oz)        Anesthesia Evaluation   Pt has had prior anesthetic.     History of anesthetic complications  - PONV.      ROS/MED HX  ENT/Pulmonary:     (+) asthma     Neurologic:       Cardiovascular:       METS/Exercise Tolerance:     Hematologic:       Musculoskeletal:       GI/Hepatic:     (+) GERD, Asymptomatic on medication,     Renal/Genitourinary:       Endo:    (-) Type II DM and thyroid disease   Psychiatric/Substance Use:       Infectious Disease:       Malignancy:       Other:            Physical Exam    Airway        Mallampati: II   TM distance: > 3 FB   Neck ROM: full   Mouth opening: > 3 cm    Respiratory Devices and Support         Dental  no notable dental history         Cardiovascular   cardiovascular exam normal          Pulmonary   pulmonary exam normal                OUTSIDE LABS:  CBC:   Lab Results   Component Value Date    WBC 7.0 02/17/2021    WBC 5.5 06/27/2018    HGB 13.9 02/17/2021    HGB 13.9 06/27/2018    HCT 43.8 02/17/2021    HCT 41.9 06/27/2018     02/17/2021     06/27/2018     BMP:   Lab Results   Component Value Date     02/17/2021     06/27/2018    POTASSIUM 4.5 02/17/2021    POTASSIUM 4.0 06/27/2018    CHLORIDE 107 02/17/2021    CHLORIDE 105 06/27/2018    CO2 29 02/17/2021    CO2 29 06/27/2018    BUN 14 02/17/2021    BUN 12 06/27/2018    CR 0.80  02/17/2021    CR 0.77 06/27/2018    GLC 96 02/17/2021    GLC 93 06/27/2018     COAGS:   Lab Results   Component Value Date    INR 0.99 01/30/2014     POC:   Lab Results   Component Value Date    BGM 93 11/04/2014    HCG Negative 11/04/2014     HEPATIC:   Lab Results   Component Value Date    ALBUMIN 3.9 02/17/2021    PROTTOTAL 7.7 02/17/2021    ALT 24 02/17/2021    AST 19 02/17/2021    ALKPHOS 65 02/17/2021    BILITOTAL 0.3 02/17/2021     OTHER:   Lab Results   Component Value Date    LACT 1.3 10/19/2013    VERNA 9.3 02/17/2021       Anesthesia Plan    ASA Status:  2      Anesthesia Type: General.   Induction: Intravenous.   Maintenance: Balanced.        Consents    Anesthesia Plan(s) and associated risks, benefits, and realistic alternatives discussed. Questions answered and patient/representative(s) expressed understanding.     - Discussed with:  Patient      - Extended Intubation/Ventilatory Support Discussed: No.      - Patient is DNR/DNI Status: No    Use of blood products discussed: No .     Postoperative Care       PONV prophylaxis: Ondansetron (or other 5HT-3), Dexamethasone or Solumedrol, Scopolamine patch     Comments:    Propofol infusion for ponv            Babs Gusman

## 2021-03-18 NOTE — OR NURSING
Patient states she feels better, able to ambulate to bathroom and void. Color looks better. States she wants to go home.

## 2021-03-18 NOTE — ANESTHESIA CARE TRANSFER NOTE
Patient: Myla Davis    Procedure(s):  ROBOTIC-ASSISTED TOTAL LAPAROSCOPIC HYSTERECTOMY, RIGHT SALPINGO-OOPHORECTOMY, CYSTOSCOPY  DAVINCI LYSIS OF ADHESION    Diagnosis: Postmenopausal bleeding [N95.0]  Diagnosis Additional Information: No value filed.    Anesthesia Type:   General     Note:    Oropharynx: oropharynx clear of all foreign objects and spontaneously breathing  Level of Consciousness: awake  Oxygen Supplementation: face mask  Level of Supplemental Oxygen (L/min / FiO2): 8  Independent Airway: airway patency satisfactory and stable  Dentition: dentition unchanged  Vital Signs Stable: post-procedure vital signs reviewed and stable  Report to RN Given: handoff report given  Patient transferred to: PACU    Handoff Report: Identifed the Patient, Identified the Reponsible Provider, Reviewed the pertinent medical history, Discussed the surgical course, Reviewed Intra-OP anesthesia mangement and issues during anesthesia, Set expectations for post-procedure period and Allowed opportunity for questions and acknowledgement of understanding      Vitals: (Last set prior to Anesthesia Care Transfer)  CRNA VITALS  3/18/2021 1043 - 3/18/2021 1121      3/18/2021             SpO2:  99 %    Resp Rate (observed):  (!) 2    Resp Rate (set):  10        Electronically Signed By: EMERSON Bobby CRNA  March 18, 2021  11:21 AM

## 2021-03-18 NOTE — OP NOTE
DaVinci-Assisted [facility] Operative Note     PREOPERATIVE DIAGNOSES:    1. Postmenopausal bleeding  2. History of breast cancer, on Tamoxifen  3. Recurrent endometrial polyps  POSTOPERATIVE DIAGNOSES:   1. Same  2. S/p DaVinci-assisted total laparoscopic hysterectomy  PROCEDURE:    1. DaVinci-assisted total laparoscopic hysterectomy  2. Right salpingo-oophorectomy  3. Cystoscopy  4. Lysis of adhesions  ESTIMATED BLOOD LOSS: 10 ml  UOP:  100ml  SURGEON: Alexia Sterling MD  ASSISTANT: Chely Ellis, not medically necessary  ANESTHESIA: General endotracheal  ANTIBIOTICS: IV Clindamycin & Gentamycin  SPECIMENS TO PATHOLOGY: Uterus, cervix and right fallopian tube and ovary  FINDINGS:  Normal appearing uterus. Normal appearing right tube and ovary. The left ovary and tube were absent. Adhesions of the bowel epiploica to the left side of the uterus. Normal bladder epithelium and bilateral ureteral jets on cystoscopy.    COMPLICATIONS: None apparent.    INDICATIONS: The patient is a 50 year old  female who presented to the office with complaints of postmenopausal bleeding. She had invasive lobular carcinoma of the breast (ER/VT positive) in  and is s/p bilateral mastectomy. She is taking Tamoxifen. In 2020 she developed postmenopausal bleeding and had a hysteroscopy for an endometrial polyp, pathology was benign. In 2020, she developed postmenopausal bleeding again, and another hysteroscopy was performed showing benign polyps. Given two surgeries in 1 year for polyps on Tamoxifen, she elected to proceed with hysterectomy. She had previously had her left tube and ovary removed, and adhesions were noted at that time requiring conversion to laparotomy. Robotic-assisted surgery was recommended for this reason. Her oncologist recommends removal of her contralateral ovary due to her ER/VT positive cancer. We reviewed the risks of infection, bleeding, injury to surrounding structures including bowel, bladder  and ureters and possible need for laparotomy. She wished to proceed with the procedure, and consent was reviewed and signed.   DESCRIPTION OF PROCEDURE:    The patient was taken to the operating room and placed on the operating room table. General anesthesia was administered without difficulty and she received IV gentamycin and clindamycin prior to skin incision for antibiotic prophylaxis. She was placed in the dorsal lithotomy position and secured to the table for the da Zari procedure. Examination under anesthesia was performed noting a mobile normal sided uterus. At this time she was prepped and draped in the usual sterile fashion. A time out was performed to confirm the patient and the procedure.      Attention was turned to the patient's vagina. A Bliss catheter was sterilely placed. An open sided speculum was placed on the patient's vagina and the small VCare manipulator was inserted in the usual fashion. Speculum was removed.     Attention was turned to the patient's abdomen. An 8 mm incision was made approximately 1 cm above the umbilicus after injection with 0.25% plain Marcaine. Veress needle was introduced into the peritoneal cavity and intraperitoneal placement was confirmed with low pressure and CO2 gas was applied after the water drop test. The abdomen was insufflated with CO2 gas. Veress needle was removed and the 8 mm camera trocar was introduced into the peritoneal cavity. Intraperitoneal placement was confirmed with the da Zari camera. The pelvis was examined with the above noted findings. The patient was then placed in Trendelenburg. Two additional trocars were placed on the patient's right and left side of her abdomen with 8 mm da Zari trocars after injection with 0.25% plain Marcaine under direct laparoscopic visualization. A 4th port was placed on the patient's right upper quadrant which was a 10 mm trocar done under laparoscopic visualization.      The da Zari robot was then docked. The  camera was reinserted. The PK graspers on the left arm and the monopolar scissors on the right arm. Next, the ureters were identified bilaterally below the IP ligaments. Right salpingo-oophorectomy was then performed.  The right round ligament was cauterized and transected. The broad ligament was then entered and bilaterally . The uterine arteries were skeletonized and the bladder flap was created. The uterine arteries were then identified bilaterally and cauterized and divided. Additional bites were then taken bilaterally of the cardinal ligaments and the uterosacral ligament. The bladder was pushed down off the VCare cup. The left uterine arteries were then clamped, cauterized and transected. The VCare cup was easily seen and colpotomy was performed using monopolar scissors. The VCare was then removed along with the uterus, cervix and right tube and ovary. A sponge wrapped in a glove was placed in the patient's vagina then to achieve pneumoperitoneum. The pelvis was irrigated and suctioned and no bleeding was noted.     A large needle  was placed in the right hand. 0 V-Loc suture was then placed in the 10 mm port and the vaginal cuff was then closed using the da Zari in a running fashion. Irrigation was once again performed. All pedicle sites were noted to be hemostatic. Both ureters were again identified and appeared to be peristalsing.     All instruments were then removed from the patient's abdomen. The da Zari robot was undocked. The abdomen was desufflated and all ports were removed. All skin incisions were closed with interrupted sutures of 4-0 Vicryl followed by Exofin glue. Attention was then turned to the patient's pelvis. The vaginal canal and vaginal cuff appeared intact and hemostatic. The Bliss was draining clear yellow urine at the end of the case. This was removed and cystoscopy was performed. A 30 degree cystoscope was inserted into the urethra and bladder. The bladder epithelium  appeared intact and without injury. Bilateral ureteral jets were noted. The bladder was drained and the cystoscope removed. The patient tolerated the procedure well. She was returned to the supine position, extubated and taken to the post-anesthesia care unit in satisfactory condition. All sponge, needle and instrument counts were correct x2 at the end of the procedure.       Alexia Sterling MD

## 2021-03-18 NOTE — ANESTHESIA PROCEDURE NOTES
Airway       Patient location during procedure: OR ([facility] - Operating Room or Procedural Area)       Procedure Start/Stop Times: 3/18/2021 9:09 AM  Staff -        Anesthesiologist:  Babs Gusman       CRNA: Isa Elizabeth APRN CRNA       Performed By: CRNA  Consent for Airway        Urgency: elective  Indications and Patient Condition       Indications for airway management: montse-procedural       Induction type:intravenous       Mask difficulty assessment: 1 - vent by mask    Final Airway Details       Final airway type: endotracheal airway       Successful airway: ETT - single  Endotracheal Airway Details        ETT size (mm): 7.0       Cuffed: yes       Cuff volume (mL): 8       Successful intubation technique: direct laryngoscopy       DL Blade Type: MAC 3       Grade View of Cords: 1       Adjucts: stylet       Position: Right       Measured from: gums/teeth       Secured at (cm): 22       Bite block used: None    Post intubation assessment        Number of other approaches attempted: 0       Secured with: pink tape       Ease of procedure: easy       Dentition: Intact and Unchanged    Medication(s) Administered   Medication Administration Time: 3/18/2021 9:09 AM

## 2021-03-18 NOTE — DISCHARGE INSTRUCTIONS
Information for Patients Discharging with a Transderm Scopolamine Patch       Dry mouth is a common side effect.    Drowsiness is another common side effect especially when combined with pain medication.  Please avoid activities that require mental alertness such as driving a car or making important legal decisions.    Since Scopolamine can cause temporary dilation of the pupils and blurred vision if it comes in contact with the eyes; be sure to wash your hands thoroughly with soap and water immediately after handling the patch.   When you remove your patch, please stick it to a tissue or paper towel for disposal.      Remove the patch immediately and contact a physician in the unlikely event that you experience symptoms of acute glaucoma (pain and reddening of the eyes, accompanied by dilated pupils).    Remove the patch if you develop any difficulties urinating.  If you cannot urinate after removing your patch, please notify your surgeon.    Remove the patch 24 hours after surgery.      Today you were given 975 mg of Tylenol at 0745am . The recommended daily maximum dose is 4000 mg.     Today you received Toradol, an antiinflammatory medication similar to Ibuprofen.  You should not take other antiinflammatory medication, such as Ibuprofen, Motrin, Advil, Aleve, Naprosyn, etc until 4:50 pm.       Same Day Surgery Discharge Instructions for  Sedation and General Anesthesia       It's not unusual to feel dizzy, light-headed or faint for up to 24 hours after surgery or while taking pain medication.  If you have these symptoms: sit for a few minutes before standing and have someone assist you when you get up to walk or use the bathroom.      You should rest and relax for the next 24 hours. We recommend you make arrangements to have an adult stay with you for at least 24 hours after your discharge.  Avoid hazardous and strenuous activity.      DO NOT DRIVE any vehicle or operate mechanical equipment for 24 hours  following the end of your surgery.  Even though you may feel normal, your reactions may be affected by the medication you have received.      Do not drink alcoholic beverages for 24 hours following surgery.       Slowly progress to your regular diet as you feel able. It's not unusual to feel nauseated and/or vomit after receiving anesthesia.  If you develop these symptoms, drink clear liquids (apple juice, ginger ale, broth, 7-up, etc. ) until you feel better.  If your nausea and vomiting persists for 24 hours, please notify your surgeon.        All narcotic pain medications, along with inactivity and anesthesia, can cause constipation. Drinking plenty of liquids and increasing fiber intake will help.      For any questions of a medical nature, call your surgeon.      Do not make important decisions for 24 hours.      If you had general anesthesia, you may have a sore throat for a couple of days related to the breathing tube used during surgery.  You may use Cepacol lozenges to help with this discomfort.  If it worsens or if you develop a fever, contact your surgeon.       If you feel your pain is not well managed with the pain medications prescribed by your surgeon, please contact your surgeon's office to let them know so they can address your concerns.       CoVid 19 Information    We want to give you information regarding Covid. Please consult your primary care provider with any questions you might have.     Patient who have symptoms (cough, fever, or shortness of breath), need to isolate for 7 days from when symptoms started OR 72 hours after fever resolves (without fever reducing medications) AND improvement of respiratory symptoms (whichever is longer).      Isolate yourself at home (in own room/own bathroom if possible)    Do Not allow any visitors    Do Not go to work or school    Do Not go to R[employer],  centers, shopping, or other public places.    Do Not shake hands.    Avoid close and intimate  contact with others (hugging, kissing).    Follow CDC recommendations for household cleaning of frequently touched services.     After the initial 7 days, continue to isolate yourself from household members as much as possible. To continue decrease the risk of community spread and exposure, you and any members of your household should limit activities in public for 14 days after starting home isolation.     You can reference the following CDC link for helpful home isolation/care tips:  https://www.cdc.gov/coronavirus/2019-ncov/downloads/10Things.pdf    Protect Others:    Cover Your Mouth and Nose with a mask, disposable tissue or wash cloth to avoid spreading germs to others.    Wash your hands and face frequently with soap and water    Call Your Primary Doctor If: Breathing difficulty develops or you become worse.    For more information about COVID19 and options for caring for yourself at home, please visit the CDC website at https://www.cdc.gov/coronavirus/2019-ncov/about/steps-when-sick.html  For more options for care at [facility], please visit our website at [email or web address]    **If you have questions or concerns about your procedure,   call Dr Sterling at [phone]**

## 2021-03-18 NOTE — ANESTHESIA POSTPROCEDURE EVALUATION
Patient: Myla Davis    Procedure(s):  ROBOTIC-ASSISTED TOTAL LAPAROSCOPIC HYSTERECTOMY, RIGHT SALPINGO-OOPHORECTOMY, CYSTOSCOPY  DAVINCI LYSIS OF ADHESION    Diagnosis:Postmenopausal bleeding [N95.0]  Diagnosis Additional Information: No value filed.    Anesthesia Type:  General    Note:  Disposition: Outpatient   Postop Pain Control: Uneventful            Sign Out: Well controlled pain   PONV: No   Neuro/Psych: Uneventful            Sign Out: Acceptable/Baseline neuro status   Airway/Respiratory: Uneventful            Sign Out: Acceptable/Baseline resp. status   CV/Hemodynamics: Uneventful            Sign Out: Acceptable CV status   Other NRE: NONE   DID A NON-ROUTINE EVENT OCCUR?          Last vitals:  Vitals:    03/18/21 1230 03/18/21 1245 03/18/21 1344   BP: 112/72 109/65 98/61   Pulse: 82 71    Resp: 13 12 12   Temp:  36.2  C (97.1  F)    SpO2: 97% 98% 95%       Last vitals prior to Anesthesia Care Transfer:  CRNA VITALS  3/18/2021 1043 - 3/18/2021 1143      3/18/2021             SpO2:  99 %    Resp Rate (observed):  (!) 2    Resp Rate (set):  10          Electronically Signed By: Babs Gusman  March 18, 2021  2:31 PM

## 2021-03-18 NOTE — OR NURSING
Patient nauseous in phase 2, has had 2 emesis. Possibly due to Dilaudid pain pill. Dr. Sterling notified, changed prescription to Oxycodone which patient states she has tolerated in the past despite her listed Percocet allergy. Continuing IV fluids and rest.

## 2021-03-19 LAB — COPATH REPORT: NORMAL

## 2022-02-17 ENCOUNTER — LAB (OUTPATIENT)
Dept: ONCOLOGY | Facility: CLINIC | Age: 52
End: 2022-02-17
Attending: INTERNAL MEDICINE
Payer: COMMERCIAL

## 2022-02-17 VITALS
DIASTOLIC BLOOD PRESSURE: 83 MMHG | WEIGHT: 128 LBS | HEART RATE: 90 BPM | TEMPERATURE: 97 F | OXYGEN SATURATION: 100 % | HEIGHT: 65 IN | RESPIRATION RATE: 16 BRPM | SYSTOLIC BLOOD PRESSURE: 121 MMHG | BODY MASS INDEX: 21.33 KG/M2

## 2022-02-17 DIAGNOSIS — Z17.0 MALIGNANT NEOPLASM OF CENTRAL PORTION OF RIGHT BREAST IN FEMALE, ESTROGEN RECEPTOR POSITIVE (H): ICD-10-CM

## 2022-02-17 DIAGNOSIS — C50.111 MALIGNANT NEOPLASM OF CENTRAL PORTION OF RIGHT BREAST IN FEMALE, ESTROGEN RECEPTOR POSITIVE (H): ICD-10-CM

## 2022-02-17 DIAGNOSIS — C50.111 MALIGNANT NEOPLASM OF CENTRAL PORTION OF RIGHT BREAST IN FEMALE, ESTROGEN RECEPTOR POSITIVE (H): Primary | ICD-10-CM

## 2022-02-17 DIAGNOSIS — Z17.0 MALIGNANT NEOPLASM OF CENTRAL PORTION OF RIGHT BREAST IN FEMALE, ESTROGEN RECEPTOR POSITIVE (H): Primary | ICD-10-CM

## 2022-02-17 LAB
ALBUMIN SERPL-MCNC: 3.7 G/DL (ref 3.4–5)
ALP SERPL-CCNC: 46 U/L (ref 40–150)
ALT SERPL W P-5'-P-CCNC: 27 U/L (ref 0–50)
ANION GAP SERPL CALCULATED.3IONS-SCNC: 3 MMOL/L (ref 3–14)
AST SERPL W P-5'-P-CCNC: 19 U/L (ref 0–45)
BILIRUB SERPL-MCNC: 0.4 MG/DL (ref 0.2–1.3)
BUN SERPL-MCNC: 20 MG/DL (ref 7–30)
CALCIUM SERPL-MCNC: 9.8 MG/DL (ref 8.5–10.1)
CANCER AG27-29 SERPL-ACNC: 15 U/ML (ref 0–39)
CHLORIDE BLD-SCNC: 108 MMOL/L (ref 94–109)
CO2 SERPL-SCNC: 29 MMOL/L (ref 20–32)
CREAT SERPL-MCNC: 0.78 MG/DL (ref 0.52–1.04)
ERYTHROCYTE [DISTWIDTH] IN BLOOD BY AUTOMATED COUNT: 12.7 % (ref 10–15)
GFR SERPL CREATININE-BSD FRML MDRD: >90 ML/MIN/1.73M2
GLUCOSE BLD-MCNC: 133 MG/DL (ref 70–99)
HCT VFR BLD AUTO: 44.5 % (ref 35–47)
HGB BLD-MCNC: 14.3 G/DL (ref 11.7–15.7)
MCH RBC QN AUTO: 30.9 PG (ref 26.5–33)
MCHC RBC AUTO-ENTMCNC: 32.1 G/DL (ref 31.5–36.5)
MCV RBC AUTO: 96 FL (ref 78–100)
PLATELET # BLD AUTO: 190 10E3/UL (ref 150–450)
POTASSIUM BLD-SCNC: 5.1 MMOL/L (ref 3.4–5.3)
PROT SERPL-MCNC: 7.5 G/DL (ref 6.8–8.8)
RBC # BLD AUTO: 4.63 10E6/UL (ref 3.8–5.2)
SODIUM SERPL-SCNC: 140 MMOL/L (ref 133–144)
WBC # BLD AUTO: 5.7 10E3/UL (ref 4–11)

## 2022-02-17 PROCEDURE — 86300 IMMUNOASSAY TUMOR CA 15-3: CPT | Performed by: INTERNAL MEDICINE

## 2022-02-17 PROCEDURE — 99213 OFFICE O/P EST LOW 20 MIN: CPT | Performed by: INTERNAL MEDICINE

## 2022-02-17 PROCEDURE — 36415 COLL VENOUS BLD VENIPUNCTURE: CPT

## 2022-02-17 PROCEDURE — 80053 COMPREHEN METABOLIC PANEL: CPT | Performed by: INTERNAL MEDICINE

## 2022-02-17 PROCEDURE — G0463 HOSPITAL OUTPT CLINIC VISIT: HCPCS

## 2022-02-17 PROCEDURE — 85027 COMPLETE CBC AUTOMATED: CPT | Performed by: INTERNAL MEDICINE

## 2022-02-17 RX ORDER — TAMOXIFEN CITRATE 20 MG/1
20 TABLET ORAL DAILY
Qty: 90 TABLET | Refills: 3 | Status: SHIPPED | OUTPATIENT
Start: 2022-02-17 | End: 2023-04-07

## 2022-02-17 ASSESSMENT — PAIN SCALES - GENERAL: PAINLEVEL: NO PAIN (0)

## 2022-02-17 NOTE — LETTER
2/17/2022         RE: Myla Davis  [address] [facility] [address]        Dear Colleague,    Thank you for referring your patient, Myla Davis, to the [facility]. Please see a copy of my visit note below.    Visit Date: 02/17/2022    Myla Davis is a 51-year-old patient who comes in today for interim followup.    CHIEF COMPLAINT:  Infiltrating lobular carcinoma of the right breast diagnosed in 2013 at age 42.    HISTORY OF PRESENT ILLNESS:  Myla is a 51-year-old patient who originally saw both Dr. Love and Dr. Adams.  She switched over to this clinic for continuing her Oncology care.  She was diagnosed with breast cancer in 2013.  It was an invasive lobular carcinoma, ER/WA positive, HER2 receptor negative.  She had bilateral mastectomies.  At definitive surgery, she had multifocal disease, although the foci were small and she had negative lymph nodes.  She did have chemotherapy with Adriamycin and Cytoxan, followed by Taxol and then post-mastectomy radiation therapy and she is now on tamoxifen.  This is her eighth year on tamoxifen and she has another 2 years to go.  She did try an aromatase inhibitor when she went into menopause, but could not tolerate it.    FAMILY HISTORY:  The patient's sister was diagnosed with breast cancer at 47.  She had a maternal aunt with breast cancer and a maternal grandmother with leukemia.    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    PAST MEDICAL AND SURGICAL HISTORY:  As of my records of 02/17/2021.    MEDICATIONS AND ALLERGIES:  Outlined in the nursing records.    COMPREHENSIVE REVIEW OF SYSTEMS:  A 12-point comprehensive review of systems has been done with her today and overall she feels well.  She has got no major symptomatology that is worrisome from my standpoint.  Of note, the  patient did have a hysterectomy and right ovary removed without evidence of malignancy and this was done on 2021.    PHYSICAL EXAMINATION:    GENERAL:  She is well-appearing lady in no acute distress.  VITAL SIGNS:  Stable.  NECK:  Has no masses or goiter.  CHEST:  Clear to auscultation and percussion bilaterally.  HEART:  Sounds 1 and 2, normal, no added sounds, no murmurs.  GASTROINTESTINAL:  Abdomen is soft and nontender.  No hepatosplenomegaly.  EXTREMITIES:  Legs without tenderness or edema.  BREASTS:  The patient is status post bilateral mastectomies with reconstruction.  The scars look good.  Right and left axillae are negative.  NEUROLOGIC:  She is alert and oriented x3.  SKIN:  Has no rashes or lesions.    DATA REVIEW:  White cell count 5.7, hemoglobin 14.3, platelets 190,000.  Liver function test within normal limits.  Creatinine 0.78.    IMPRESSION:  This is a 51-year-old patient with a diagnosis of breast cancer.  She is continuing on active treatment with adjuvant tamoxifen.  She is on year 8 of the tamoxifen.  The tamoxifen is going well for her.  She did have a hysterectomy done and she had one ovary remaining, which she had removed on the right side.  She will continue with me on a yearly basis and all of the above has been discussed with her today.    Faye Landeros MD        D: 2022   T: 2022   MT: ALEXIS    Name:     DEBBIE FARIAS  MRN:      [id number]        Account:    [id number]   :      1970           Visit Date: 2022     Document: [id number]      Again, thank you for allowing me to participate in the care of your patient.        Sincerely,        Faye Landeros MD

## 2022-02-17 NOTE — NURSING NOTE
"Oncology Rooming Note    February 17, 2022 10:26 AM   Myla Davis is a 51 year old female who presents for:    Chief Complaint   Patient presents with     Oncology Clinic Visit     Malignant neoplasm of other specified sites of female breast     Initial Vitals: /83 (Cuff Size: Adult Regular)   Pulse 90   Temp 97  F (36.1  C) (Tympanic)   Resp 16   Ht 1.651 m (5' 5\")   Wt 58.1 kg (128 lb)   LMP 09/27/2013   SpO2 100%   BMI 21.30 kg/m   Estimated body mass index is 21.3 kg/m  as calculated from the following:    Height as of this encounter: 1.651 m (5' 5\").    Weight as of this encounter: 58.1 kg (128 lb). Body surface area is 1.63 meters squared.  No Pain (0) Comment: Data Unavailable   Patient's last menstrual period was 09/27/2013.  Allergies reviewed: Yes  Medications reviewed: Yes    Medications: Medication refills not needed today.  Pharmacy name entered into [facility]:    [id number] - [place], [place] - [address] [facility] [address] [facility] [address]  [facility] [place], [place] - [address]    Clinical concerns: f/u       Sue Mosquera CMA              "

## 2022-02-17 NOTE — PROGRESS NOTES
Visit Date: 02/17/2022    Myla Davis is a 51-year-old patient who comes in today for interim followup.    CHIEF COMPLAINT:  Infiltrating lobular carcinoma of the right breast diagnosed in 2013 at age 42.    HISTORY OF PRESENT ILLNESS:  Myla is a 51-year-old patient who originally saw both Dr. Love and Dr. Adams.  She switched over to this clinic for continuing her Oncology care.  She was diagnosed with breast cancer in 2013.  It was an invasive lobular carcinoma, ER/MI positive, HER2 receptor negative.  She had bilateral mastectomies.  At definitive surgery, she had multifocal disease, although the foci were small and she had negative lymph nodes.  She did have chemotherapy with Adriamycin and Cytoxan, followed by Taxol and then post-mastectomy radiation therapy and she is now on tamoxifen.  This is her eighth year on tamoxifen and she has another 2 years to go.  She did try an aromatase inhibitor when she went into menopause, but could not tolerate it.    FAMILY HISTORY:  The patient's sister was diagnosed with breast cancer at 47.  She had a maternal aunt with breast cancer and a maternal grandmother with leukemia.    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    PAST MEDICAL AND SURGICAL HISTORY:  As of my records of 02/17/2021.    MEDICATIONS AND ALLERGIES:  Outlined in the nursing records.    COMPREHENSIVE REVIEW OF SYSTEMS:  A 12-point comprehensive review of systems has been done with her today and overall she feels well.  She has got no major symptomatology that is worrisome from my standpoint.  Of note, the patient did have a hysterectomy and right ovary removed without evidence of malignancy and this was done on 03/18/2021.    PHYSICAL EXAMINATION:    GENERAL:  She is well-appearing lady in no acute distress.  VITAL SIGNS:  Stable.  NECK:  Has no masses or  goiter.  CHEST:  Clear to auscultation and percussion bilaterally.  HEART:  Sounds 1 and 2, normal, no added sounds, no murmurs.  GASTROINTESTINAL:  Abdomen is soft and nontender.  No hepatosplenomegaly.  EXTREMITIES:  Legs without tenderness or edema.  BREASTS:  The patient is status post bilateral mastectomies with reconstruction.  The scars look good.  Right and left axillae are negative.  NEUROLOGIC:  She is alert and oriented x3.  SKIN:  Has no rashes or lesions.    DATA REVIEW:  White cell count 5.7, hemoglobin 14.3, platelets 190,000.  Liver function test within normal limits.  Creatinine 0.78.    IMPRESSION:  This is a 51-year-old patient with a diagnosis of breast cancer.  She is continuing on active treatment with adjuvant tamoxifen.  She is on year 8 of the tamoxifen.  The tamoxifen is going well for her.  She did have a hysterectomy done and she had one ovary remaining, which she had removed on the right side.  She will continue with me on a yearly basis and all of the above has been discussed with her today.    Faye Landeros MD        D: 2022   T: 2022   MT: ALEXIS    Name:     DEBBIE FARIAS  MRN:      [id number]        Account:    [id number]   :      1970           Visit Date: 2022     Document: [id number]

## 2022-03-01 NOTE — PATIENT INSTRUCTIONS
RTC MD 1 year -- scheduling deferred until 9/17/22 by BENEDICTO Hutchinson, RN, BSN, OCN  Nurse Care Coordinator  [facility]  P: [phone]     F: [phone]

## 2023-02-22 ENCOUNTER — VIRTUAL VISIT (OUTPATIENT)
Dept: ONCOLOGY | Facility: CLINIC | Age: 53
End: 2023-02-22
Attending: INTERNAL MEDICINE
Payer: COMMERCIAL

## 2023-02-22 DIAGNOSIS — C50.111 MALIGNANT NEOPLASM OF CENTRAL PORTION OF RIGHT BREAST IN FEMALE, ESTROGEN RECEPTOR POSITIVE (H): Primary | ICD-10-CM

## 2023-02-22 DIAGNOSIS — Z17.0 MALIGNANT NEOPLASM OF CENTRAL PORTION OF RIGHT BREAST IN FEMALE, ESTROGEN RECEPTOR POSITIVE (H): Primary | ICD-10-CM

## 2023-02-22 PROCEDURE — 99213 OFFICE O/P EST LOW 20 MIN: CPT | Mod: VID | Performed by: INTERNAL MEDICINE

## 2023-02-22 NOTE — LETTER
"    2/22/2023         RE: Myla Davis  [address] [facility] [address]        Dear Colleague,    Thank you for referring your patient, Myla Davis, to the [facility]. Please see a copy of my visit note below.    Video-Visit Details    Type of service:  Video Visit    Video Start Time (time video started): ***    Video End Time (time video stopped): ***    Originating Location (pt. Location): {video visit patient location:171577::\"Home\"}    {PROVIDER LOCATION On-site should be selected for visits conducted from your clinic location or adjoining [facility] hospital, academic office, or other nearby [facility] building. Off-site should be selected for all other provider locations, including home:367649}    Distant Location (provider location):  {virtual location provider:545280}    Mode of Communication:  Video Conference via [employer]            Visit Date: 02/22/2023    Myla Davis is a 52-year-old patient who is being evaluated today by a video visit due to a public health emergency with coronavirus, and she has given consent.    PATIENT LOCATION:  Home.    PHYSICIAN LOCATION:  Home.    CHIEF COMPLAINT:  Infiltrating lobular carcinoma of the right breast diagnosed at age 42.  She will be 10 years out from her diagnosis in 09/2023.    HISTORY OF PRESENTING COMPLAINT:  Myla is a 52-year-old patient who has an invasive lobular carcinoma, which was ER/ID positive, HER2 receptor negative, and she had bilateral mastectomies.  She will be 10 years out in 09/2023.  She had multifocal disease and negative lymph nodes.  She did have adjuvant chemotherapy with Adriamycin and Cytoxan followed by Taxol and then post-mastectomy radiation treatment.  She is on tamoxifen, which is due to finish in 01/24.  She is having no bleeding.  She is having no problems with the tamoxifen now.  She did have some uterine issues and had a hysterectomy done and since she has had a hysterectomy done she " actually feels really good.  She has no major complaints today.  She denies cough, shortness of breath, bone pain, any worrisome symptomatology. 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    FAMILY HISTORY:  The patient's sister was diagnosed with breast cancer at 47.  She had a maternal aunt with breast cancer, maternal grandmother with leukemia.    PAST MEDICAL AND SURGICAL HISTORY:  As of my records of 2021.    MEDICATIONS AND ALLERGIES:  Outlined in the nursing records.    PHYSICAL EXAMINATION:    GENERAL:  Overall, she looks well.  EYES:  Have no redness or discharge.  The patient is alert and oriented x 3.  SKIN:  Has no rashes or lesions.  MUSCULOSKELETAL:  She is moving all extremities.  PSYCHIATRIC:  Normal.  RESPIRATORY:  No cough or labored breathing.    The rest of her comprehensive physical exam is deferred today because this is a video visit with video visit restrictions.    DATA REVIEW:  She is due for some lab work and will come in for that next week.    IMPRESSION:  This is a 52-year-old patient.  She is continuing on active treatment with adjuvant tamoxifen.  She has about a year to go on the tamoxifen.  She does not have any problems on it since she had a hysterectomy.  She is going to see me back in a year, when she will stop the tamoxifen and I will review her labs when they come in.    Faye Landeros MD        D: 2023   T: 2023   MT: SPMT    Name:     DEBBIE FARIAS  MRN:      [id number]        Account:    [id number]   :      1970           Visit Date: 2023     Document: [id number]      Again, thank you for allowing me to participate in the care of your patient.        Sincerely,        Faye Landeros MD

## 2023-02-22 NOTE — LETTER
"    2/22/2023         RE: Myla Davis  [address] [facility] [address]        Dear Colleague,    Thank you for referring your patient, Myla Davis, to the [facility]. Please see a copy of my visit note below.    Video-Visit Details    Type of service:  Video Visit    Video Start Time (time video started): ***    Video End Time (time video stopped): ***    Originating Location (pt. Location): {video visit patient location:069888::\"Home\"}    {PROVIDER LOCATION On-site should be selected for visits conducted from your clinic location or adjoining [facility] hospital, academic office, or other nearby [facility] building. Off-site should be selected for all other provider locations, including home:222699}    Distant Location (provider location):  {virtual location provider:829706}    Mode of Communication:  Video Conference via [employer]            Visit Date: 02/22/2023    Myla Davis is a 52-year-old patient who is being evaluated today by a video visit due to a public health emergency with coronavirus, and she has given consent.    PATIENT LOCATION:  Home.    PHYSICIAN LOCATION:  Home.    CHIEF COMPLAINT:  Infiltrating lobular carcinoma of the right breast diagnosed at age 42.  She will be 10 years out from her diagnosis in 09/2023.    HISTORY OF PRESENTING COMPLAINT:  Myla is a 52-year-old patient who has an invasive lobular carcinoma, which was ER/ID positive, HER2 receptor negative, and she had bilateral mastectomies.  She will be 10 years out in 09/2023.  She had multifocal disease and negative lymph nodes.  She did have adjuvant chemotherapy with Adriamycin and Cytoxan followed by Taxol and then post-mastectomy radiation treatment.  She is on tamoxifen, which is due to finish in 01/24.  She is having no bleeding.  She is having no problems with the tamoxifen now.  She did have some uterine issues and had a hysterectomy done and since she has had a hysterectomy done she " actually feels really good.  She has no major complaints today.  She denies cough, shortness of breath, bone pain, any worrisome symptomatology. 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    FAMILY HISTORY:  The patient's sister was diagnosed with breast cancer at 47.  She had a maternal aunt with breast cancer, maternal grandmother with leukemia.    PAST MEDICAL AND SURGICAL HISTORY:  As of my records of 2021.    MEDICATIONS AND ALLERGIES:  Outlined in the nursing records.    PHYSICAL EXAMINATION:    GENERAL:  Overall, she looks well.  EYES:  Have no redness or discharge.  The patient is alert and oriented x 3.  SKIN:  Has no rashes or lesions.  MUSCULOSKELETAL:  She is moving all extremities.  PSYCHIATRIC:  Normal.  RESPIRATORY:  No cough or labored breathing.    The rest of her comprehensive physical exam is deferred today because this is a video visit with video visit restrictions.    DATA REVIEW:  She is due for some lab work and will come in for that next week.    IMPRESSION:  This is a 52-year-old patient.  She is continuing on active treatment with adjuvant tamoxifen.  She has about a year to go on the tamoxifen.  She does not have any problems on it since she had a hysterectomy.  She is going to see me back in a year, when she will stop the tamoxifen and I will review her labs when they come in.    Faye Landeros MD        D: 2023   T: 2023   MT: SPMT    Name:     DEBBIE FARIAS  MRN:      [id number]        Account:    [id number]   :      1970           Visit Date: 2023     Document: [id number]      Again, thank you for allowing me to participate in the care of your patient.        Sincerely,        Faye Landeros MD

## 2023-02-22 NOTE — NURSING NOTE
Is the patient currently in the state of [place]? YES    Visit mode:VIDEO    If the visit is dropped, the patient can be reconnected by: VIDEO VISIT: Send to e-mail at: [email or web address]    Will anyone else be joining the visit? NO    How would you like to obtain your AVS? MyChart    Are changes needed to the allergy or medication list? NO    Chief Complaint   Patient presents with     Video Visit     1 year follow up     iLnda Quispe, MERCEDEZ/FELICEN

## 2023-02-23 NOTE — PROGRESS NOTES
Visit Date: 02/22/2023    Myla Davis is a 52-year-old patient who is being evaluated today by a video visit due to a public health emergency with coronavirus, and she has given consent.    PATIENT LOCATION:  Home.    PHYSICIAN LOCATION:  Home.    CHIEF COMPLAINT:  Infiltrating lobular carcinoma of the right breast diagnosed at age 42.  She will be 10 years out from her diagnosis in 09/2023.    HISTORY OF PRESENTING COMPLAINT:  Myla is a 52-year-old patient who has an invasive lobular carcinoma, which was ER/RI positive, HER2 receptor negative, and she had bilateral mastectomies.  She will be 10 years out in 09/2023.  She had multifocal disease and negative lymph nodes.  She did have adjuvant chemotherapy with Adriamycin and Cytoxan followed by Taxol and then post-mastectomy radiation treatment.  She is on tamoxifen, which is due to finish in 01/24.  She is having no bleeding.  She is having no problems with the tamoxifen now.  She did have some uterine issues and had a hysterectomy done and since she has had a hysterectomy done she actually feels really good.  She has no major complaints today.  She denies cough, shortness of breath, bone pain, any worrisome symptomatology. 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    FAMILY HISTORY:  The patient's sister was diagnosed with breast cancer at 47.  She had a maternal aunt with breast cancer, maternal grandmother with leukemia.    PAST MEDICAL AND SURGICAL HISTORY:  As of my records of 02/17/2021.    MEDICATIONS AND ALLERGIES:  Outlined in the nursing records.    PHYSICAL EXAMINATION:    GENERAL:  Overall, she looks well.  EYES:  Have no redness or discharge.  The patient is alert and oriented x 3.  SKIN:  Has no rashes or lesions.  MUSCULOSKELETAL:  She is moving all extremities.  PSYCHIATRIC:  Normal.  RESPIRATORY:  No cough or labored breathing.    The rest of her comprehensive physical exam is deferred today  because this is a video visit with video visit restrictions.    DATA REVIEW:  She is due for some lab work and will come in for that next week.    IMPRESSION:  This is a 52-year-old patient.  She is continuing on active treatment with adjuvant tamoxifen.  She has about a year to go on the tamoxifen.  She does not have any problems on it since she had a hysterectomy.  She is going to see me back in a year, when she will stop the tamoxifen and I will review her labs when they come in.    Faye Landeros MD        D: 2023   T: 2023   MT: TIFFANY    Name:     DEBBIE FARIAS  MRN:      [id number]        Account:    [id number]   :      1970           Visit Date: 2023     Document: [id number]

## 2023-02-28 ENCOUNTER — LAB (OUTPATIENT)
Dept: ONCOLOGY | Facility: CLINIC | Age: 53
End: 2023-02-28
Attending: INTERNAL MEDICINE
Payer: COMMERCIAL

## 2023-02-28 DIAGNOSIS — Z17.0 MALIGNANT NEOPLASM OF CENTRAL PORTION OF RIGHT BREAST IN FEMALE, ESTROGEN RECEPTOR POSITIVE (H): ICD-10-CM

## 2023-02-28 DIAGNOSIS — C50.111 MALIGNANT NEOPLASM OF CENTRAL PORTION OF RIGHT BREAST IN FEMALE, ESTROGEN RECEPTOR POSITIVE (H): ICD-10-CM

## 2023-02-28 LAB
ALBUMIN SERPL BCG-MCNC: 4.1 G/DL (ref 3.5–5.2)
ALP SERPL-CCNC: 45 U/L (ref 35–104)
ALT SERPL W P-5'-P-CCNC: 21 U/L (ref 10–35)
ANION GAP SERPL CALCULATED.3IONS-SCNC: 7 MMOL/L (ref 7–15)
AST SERPL W P-5'-P-CCNC: 23 U/L (ref 10–35)
BILIRUB SERPL-MCNC: 0.3 MG/DL
BUN SERPL-MCNC: 16.1 MG/DL (ref 6–20)
CALCIUM SERPL-MCNC: 9.6 MG/DL (ref 8.6–10)
CHLORIDE SERPL-SCNC: 105 MMOL/L (ref 98–107)
CREAT SERPL-MCNC: 0.78 MG/DL (ref 0.51–0.95)
DEPRECATED HCO3 PLAS-SCNC: 28 MMOL/L (ref 22–29)
ERYTHROCYTE [DISTWIDTH] IN BLOOD BY AUTOMATED COUNT: 12.2 % (ref 10–15)
GFR SERPL CREATININE-BSD FRML MDRD: >90 ML/MIN/1.73M2
GLUCOSE SERPL-MCNC: 99 MG/DL (ref 70–99)
HCT VFR BLD AUTO: 41.9 % (ref 35–47)
HGB BLD-MCNC: 13.5 G/DL (ref 11.7–15.7)
MCH RBC QN AUTO: 31.3 PG (ref 26.5–33)
MCHC RBC AUTO-ENTMCNC: 32.2 G/DL (ref 31.5–36.5)
MCV RBC AUTO: 97 FL (ref 78–100)
PLATELET # BLD AUTO: 165 10E3/UL (ref 150–450)
POTASSIUM SERPL-SCNC: 4.6 MMOL/L (ref 3.4–5.3)
PROT SERPL-MCNC: 6.7 G/DL (ref 6.4–8.3)
RBC # BLD AUTO: 4.32 10E6/UL (ref 3.8–5.2)
SODIUM SERPL-SCNC: 140 MMOL/L (ref 136–145)
WBC # BLD AUTO: 4.5 10E3/UL (ref 4–11)

## 2023-02-28 PROCEDURE — 85027 COMPLETE CBC AUTOMATED: CPT | Performed by: INTERNAL MEDICINE

## 2023-02-28 PROCEDURE — 80053 COMPREHEN METABOLIC PANEL: CPT | Performed by: INTERNAL MEDICINE

## 2023-02-28 PROCEDURE — 86300 IMMUNOASSAY TUMOR CA 15-3: CPT | Performed by: INTERNAL MEDICINE

## 2023-02-28 PROCEDURE — 36415 COLL VENOUS BLD VENIPUNCTURE: CPT

## 2023-02-28 NOTE — PROGRESS NOTES
Medical Assistant Note:  Myla Davis presents today for blood draw.    Patient seen by provider today: No.   present during visit today: Not Applicable.    Concerns: No Concerns.    Procedure:  Labs drawn    Post Assessment:  Labs drawn without difficulty: Yes.    Discharge Plan:  Departure Mode: Ambulatory.    Face to Face Time: 10 min.    Sarah Flores CMA

## 2023-03-01 LAB — CANCER AG27-29 SERPL-ACNC: 12.4 U/ML

## 2023-04-07 DIAGNOSIS — Z17.0 MALIGNANT NEOPLASM OF CENTRAL PORTION OF RIGHT BREAST IN FEMALE, ESTROGEN RECEPTOR POSITIVE (H): ICD-10-CM

## 2023-04-07 DIAGNOSIS — C50.111 MALIGNANT NEOPLASM OF CENTRAL PORTION OF RIGHT BREAST IN FEMALE, ESTROGEN RECEPTOR POSITIVE (H): ICD-10-CM

## 2023-04-07 NOTE — TELEPHONE ENCOUNTER
Pending Prescriptions:                       Disp   Refills    tamoxifen (NOLVADEX) 20 MG tablet         90 tab*2            Sig: Take 1 tablet (20 mg) by mouth daily         Last Written Prescription Date:  2/17/22  Last Fill Quantity: 90,   # refills: 3  Last Office Visit: 2/22/23  Future Office visit:   Needs to be scheduled. Per Dr. Landeros's last note on 2/22 the patient has one year left of Tamoxifen    Routing refill request to provider for review  Silvia Leach RN on 4/7/2023 at 4:53 PM

## 2023-04-09 RX ORDER — TAMOXIFEN CITRATE 20 MG/1
20 TABLET ORAL DAILY
Qty: 90 TABLET | Refills: 3 | Status: SHIPPED | OUTPATIENT
Start: 2023-04-09

## 2023-04-09 NOTE — TELEPHONE ENCOUNTER
Signed Prescriptions:                        Disp   Refills    tamoxifen (NOLVADEX) 20 MG tablet          90 tab*3        Sig: Take 1 tablet (20 mg) by mouth daily  Authorizing Provider: CHAYA PALACIOS, RN, BSN, OCN  Nurse Care Coordinator  [facility]  P: [phone]     F: [phone]

## 2023-04-15 ENCOUNTER — HEALTH MAINTENANCE LETTER (OUTPATIENT)
Age: 53
End: 2023-04-15

## 2023-06-02 ENCOUNTER — HEALTH MAINTENANCE LETTER (OUTPATIENT)
Age: 53
End: 2023-06-02

## 2024-02-21 ENCOUNTER — ONCOLOGY VISIT (OUTPATIENT)
Dept: ONCOLOGY | Facility: CLINIC | Age: 54
End: 2024-02-21
Attending: INTERNAL MEDICINE
Payer: COMMERCIAL

## 2024-02-21 VITALS
SYSTOLIC BLOOD PRESSURE: 111 MMHG | WEIGHT: 129.2 LBS | TEMPERATURE: 97.5 F | HEART RATE: 85 BPM | RESPIRATION RATE: 14 BRPM | BODY MASS INDEX: 20.76 KG/M2 | OXYGEN SATURATION: 100 % | HEIGHT: 66 IN | DIASTOLIC BLOOD PRESSURE: 77 MMHG

## 2024-02-21 DIAGNOSIS — C50.111 MALIGNANT NEOPLASM OF CENTRAL PORTION OF RIGHT BREAST IN FEMALE, ESTROGEN RECEPTOR POSITIVE (H): Primary | ICD-10-CM

## 2024-02-21 DIAGNOSIS — Z17.0 MALIGNANT NEOPLASM OF CENTRAL PORTION OF RIGHT BREAST IN FEMALE, ESTROGEN RECEPTOR POSITIVE (H): Primary | ICD-10-CM

## 2024-02-21 LAB
ALBUMIN SERPL BCG-MCNC: 4.6 G/DL (ref 3.5–5.2)
ALP SERPL-CCNC: 54 U/L (ref 40–150)
ALT SERPL W P-5'-P-CCNC: 39 U/L (ref 0–50)
ANION GAP SERPL CALCULATED.3IONS-SCNC: 9 MMOL/L (ref 7–15)
AST SERPL W P-5'-P-CCNC: 24 U/L (ref 0–45)
BILIRUB SERPL-MCNC: 0.2 MG/DL
BUN SERPL-MCNC: 18.8 MG/DL (ref 6–20)
CALCIUM SERPL-MCNC: 9.8 MG/DL (ref 8.6–10)
CHLORIDE SERPL-SCNC: 103 MMOL/L (ref 98–107)
CREAT SERPL-MCNC: 0.75 MG/DL (ref 0.51–0.95)
DEPRECATED HCO3 PLAS-SCNC: 28 MMOL/L (ref 22–29)
EGFRCR SERPLBLD CKD-EPI 2021: >90 ML/MIN/1.73M2
ERYTHROCYTE [DISTWIDTH] IN BLOOD BY AUTOMATED COUNT: 12 % (ref 10–15)
GLUCOSE SERPL-MCNC: 102 MG/DL (ref 70–99)
HCT VFR BLD AUTO: 40.5 % (ref 35–47)
HGB BLD-MCNC: 13.2 G/DL (ref 11.7–15.7)
MCH RBC QN AUTO: 31.1 PG (ref 26.5–33)
MCHC RBC AUTO-ENTMCNC: 32.6 G/DL (ref 31.5–36.5)
MCV RBC AUTO: 95 FL (ref 78–100)
PLATELET # BLD AUTO: 190 10E3/UL (ref 150–450)
POTASSIUM SERPL-SCNC: 4.4 MMOL/L (ref 3.4–5.3)
PROT SERPL-MCNC: 7.6 G/DL (ref 6.4–8.3)
RBC # BLD AUTO: 4.25 10E6/UL (ref 3.8–5.2)
SODIUM SERPL-SCNC: 140 MMOL/L (ref 135–145)
WBC # BLD AUTO: 6.7 10E3/UL (ref 4–11)

## 2024-02-21 PROCEDURE — 80053 COMPREHEN METABOLIC PANEL: CPT | Performed by: INTERNAL MEDICINE

## 2024-02-21 PROCEDURE — 99213 OFFICE O/P EST LOW 20 MIN: CPT | Performed by: INTERNAL MEDICINE

## 2024-02-21 PROCEDURE — 36415 COLL VENOUS BLD VENIPUNCTURE: CPT

## 2024-02-21 PROCEDURE — 85048 AUTOMATED LEUKOCYTE COUNT: CPT | Performed by: INTERNAL MEDICINE

## 2024-02-21 PROCEDURE — 86300 IMMUNOASSAY TUMOR CA 15-3: CPT | Performed by: INTERNAL MEDICINE

## 2024-02-21 PROCEDURE — 99214 OFFICE O/P EST MOD 30 MIN: CPT | Performed by: INTERNAL MEDICINE

## 2024-02-21 ASSESSMENT — PAIN SCALES - GENERAL: PAINLEVEL: NO PAIN (0)

## 2024-02-21 NOTE — PROGRESS NOTES
Medical Assistant Note:  Myla Davis presents today for blood draw.    Patient seen by provider today: Yes: .   present during visit today: Not Applicable.    Concerns: No Concerns.    Procedure:  Lab draw site: left antecub, Needle type: butterfly, Gauge: 23.    Post Assessment:  Labs drawn without difficulty: Yes.    Discharge Plan:  Departure Mode: Ambulatory.    Face to Face Time: 10.    Jessica Mccartney, CMA

## 2024-02-21 NOTE — NURSING NOTE
"Oncology Rooming Note    February 21, 2024 3:59 PM   Myla Davis is a 53 year old female who presents for:    Chief Complaint   Patient presents with    Oncology Clinic Visit     Malignant neoplasm of other specified sites of female breast       Initial Vitals: /77   Pulse 85   Temp 97.5  F (36.4  C) (Tympanic)   Resp 14   Ht 1.664 m (5' 5.5\")   Wt 58.6 kg (129 lb 3.2 oz)   LMP 09/18/2013   SpO2 100%   BMI 21.17 kg/m   Estimated body mass index is 21.17 kg/m  as calculated from the following:    Height as of this encounter: 1.664 m (5' 5.5\").    Weight as of this encounter: 58.6 kg (129 lb 3.2 oz). Body surface area is 1.65 meters squared.  No Pain (0) Comment: Data Unavailable   Patient's last menstrual period was 09/18/2013.  Allergies reviewed: Yes  Medications reviewed: Yes    Medications: Medication refills not needed today.  Pharmacy name entered into [facility]:    [id number] - [place], [place] - [address] [facility] [address]  [facility] [place], [place] - [address]    Frailty Screening:   Is the patient here for a new oncology consult visit in cancer care? 2. No      Clinical concerns: 1 year follow up       Amberly Camacho MA              "

## 2024-02-21 NOTE — LETTER
2/21/2024         RE: Myla Davis  [address] [facility] [address]        Dear Colleague,    Thank you for referring your patient, Myla Davis, to the [facility]. Please see a copy of my visit note below.    CHIEF COMPLAINT:      Infiltrating lobular carcinoma of the right breast diagnosed at age 42.   2.    She is  10 years out from her diagnosis as of 09/2023.  3.    Stopped Tamoxifen after 10 years     HISTORY OF PRESENTING COMPLAINT:      Myla is a 53-year-old patient who has an invasive lobular carcinoma, which was ER/MA positive, HER2 receptor negative, and she had bilateral mastectomies.  She will be 10 years out in 09/2023.  She had multifocal disease and negative lymph nodes.  She did have adjuvant chemotherapy with Adriamycin and Cytoxan followed by Taxol and then post-mastectomy radiation treatment.  She has stopped tamoxifen none after 10 years . She did have some uterine issues and had a hysterectomy done and since she has had a hysterectomy done she actually feels really good.  She has no major complaints today.  She denies cough, shortness of breath, bone pain, any worrisome symptomatology.     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     FAMILY HISTORY:  The patient's sister was diagnosed with breast cancer at 47.  She had a maternal aunt with breast cancer, maternal grandmother with leukemia.     PAST MEDICAL AND SURGICAL HISTORY:  As of my records of 02/17/2021.     MEDICATIONS AND ALLERGIES:  Outlined in the nursing records.    On physical exam  Well-appearing lady no acute distress  Alert and orientated x 3  Neck is no masses or lymph nodes  Respiratory exam normal  Cardiovascular exam normal  Breast exam: Patient is status post bilateral mastectomies with reconstruction the scars look good there is no palpable nodules on the chest wall right and left axilla are negative  GI exam normal  Legs are  without tenderness or edema  Psychiatric exam normal    Data review:    White cell count 6.7 hemoglobin 13.2 platelets 190  CMP within normal limits      Impression and plan:    53-year-old patient with a diagnosis of breast cancer as outlined above.  It was an infiltrating lobular carcinoma of the right breast and she was treated at age 42 with neoadjuvant Adriamycin and Cytoxan followed by Taxol and then had bilateral mastectomies done in February 2014.  She had some minute foci of tumor left in the breast on the right side.  There were some isolated cells in 1 lymph node.  She then had postmastectomy radiation therapy and 10 years of adjuvant tamoxifen.  She can follow-up with me on 1 to 2-year basis at this point    I am happy with her progress    Patient is in agreement with the above plan    Faye Landeros MD          Again, thank you for allowing me to participate in the care of your patient.        Sincerely,        Faye Landeros MD

## 2024-02-21 NOTE — PROGRESS NOTES
CHIEF COMPLAINT:      Infiltrating lobular carcinoma of the right breast diagnosed at age 42.   2.    She is  10 years out from her diagnosis as of 09/2023.  3.    Stopped Tamoxifen after 10 years     HISTORY OF PRESENTING COMPLAINT:      Myla is a 53-year-old patient who has an invasive lobular carcinoma, which was ER/MN positive, HER2 receptor negative, and she had bilateral mastectomies.  She will be 10 years out in 09/2023.  She had multifocal disease and negative lymph nodes.  She did have adjuvant chemotherapy with Adriamycin and Cytoxan followed by Taxol and then post-mastectomy radiation treatment.  She has stopped tamoxifen none after 10 years . She did have some uterine issues and had a hysterectomy done and since she has had a hysterectomy done she actually feels really good.  She has no major complaints today.  She denies cough, shortness of breath, bone pain, any worrisome symptomatology.     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     FAMILY HISTORY:  The patient's sister was diagnosed with breast cancer at 47.  She had a maternal aunt with breast cancer, maternal grandmother with leukemia.     PAST MEDICAL AND SURGICAL HISTORY:  As of my records of 02/17/2021.     MEDICATIONS AND ALLERGIES:  Outlined in the nursing records.    On physical exam  Well-appearing lady no acute distress  Alert and orientated x 3  Neck is no masses or lymph nodes  Respiratory exam normal  Cardiovascular exam normal  Breast exam: Patient is status post bilateral mastectomies with reconstruction the scars look good there is no palpable nodules on the chest wall right and left axilla are negative  GI exam normal  Legs are without tenderness or edema  Psychiatric exam normal    Data review:    White cell count 6.7 hemoglobin 13.2 platelets 190  CMP within normal limits      Impression and plan:    53-year-old patient with a diagnosis of breast cancer as outlined above.  It was an  infiltrating lobular carcinoma of the right breast and she was treated at age 42 with neoadjuvant Adriamycin and Cytoxan followed by Taxol and then had bilateral mastectomies done in February 2014.  She had some minute foci of tumor left in the breast on the right side.  There were some isolated cells in 1 lymph node.  She then had postmastectomy radiation therapy and 10 years of adjuvant tamoxifen.  She can follow-up with me on 1 to 2-year basis at this point    I am happy with her progress    Patient is in agreement with the above plan    Faye Landeros MD

## 2024-02-23 LAB — CANCER AG27-29 SERPL-ACNC: 16.3 U/ML

## 2024-11-09 ENCOUNTER — HEALTH MAINTENANCE LETTER (OUTPATIENT)
Age: 54
End: 2024-11-09

## 2025-06-05 NOTE — PROGRESS NOTES
Medical Assistant Note:  Myla Davis presents today for blood draw.    Patient seen by provider today: Yes: Dr. Landeros.   present during visit today: Not Applicable.    Concerns: No Concerns.    Procedure:  Labs drawn    Post Assessment:  Labs drawn without difficulty: Yes.    Discharge Plan:  Departure Mode: Ambulatory.    Face to Face Time: 10 min.    Sarah Flores CMA           Name band;

## 2025-07-18 ENCOUNTER — HOSPITAL ENCOUNTER (EMERGENCY)
Facility: CLINIC | Age: 55
Discharge: HOME | End: 2025-07-18
Attending: STUDENT IN AN ORGANIZED HEALTH CARE EDUCATION/TRAINING PROGRAM | Admitting: STUDENT IN AN ORGANIZED HEALTH CARE EDUCATION/TRAINING PROGRAM
Payer: COMMERCIAL

## 2025-07-18 VITALS
TEMPERATURE: 98.9 F | BODY MASS INDEX: 22 KG/M2 | HEART RATE: 88 BPM | WEIGHT: 132.06 LBS | DIASTOLIC BLOOD PRESSURE: 75 MMHG | OXYGEN SATURATION: 100 % | HEIGHT: 65 IN | RESPIRATION RATE: 16 BRPM | SYSTOLIC BLOOD PRESSURE: 122 MMHG

## 2025-07-18 DIAGNOSIS — R21 RASH AND NONSPECIFIC SKIN ERUPTION: ICD-10-CM

## 2025-07-18 DIAGNOSIS — D69.2 PURPURA: ICD-10-CM

## 2025-07-18 LAB
ALBUMIN SERPL BCG-MCNC: 4.4 G/DL (ref 3.5–5.2)
ALBUMIN UR-MCNC: NEGATIVE MG/DL
ALP SERPL-CCNC: 81 U/L (ref 40–150)
ALT SERPL W P-5'-P-CCNC: 25 U/L (ref 0–50)
ANION GAP SERPL CALCULATED.3IONS-SCNC: 11 MMOL/L (ref 7–15)
APPEARANCE UR: CLEAR
AST SERPL W P-5'-P-CCNC: 24 U/L (ref 0–45)
BASOPHILS # BLD AUTO: 0 10E3/UL (ref 0–0.2)
BASOPHILS NFR BLD AUTO: 0 %
BILIRUB SERPL-MCNC: 0.4 MG/DL
BILIRUB UR QL STRIP: NEGATIVE
BUN SERPL-MCNC: 12.1 MG/DL (ref 6–20)
CALCIUM SERPL-MCNC: 9.7 MG/DL (ref 8.8–10.4)
CHLORIDE SERPL-SCNC: 106 MMOL/L (ref 98–107)
COLOR UR AUTO: ABNORMAL
CREAT SERPL-MCNC: 0.66 MG/DL (ref 0.51–0.95)
EGFRCR SERPLBLD CKD-EPI 2021: >90 ML/MIN/1.73M2
EOSINOPHIL # BLD AUTO: 0.1 10E3/UL (ref 0–0.7)
EOSINOPHIL NFR BLD AUTO: 1 %
ERYTHROCYTE [DISTWIDTH] IN BLOOD BY AUTOMATED COUNT: 12.4 % (ref 10–15)
GLUCOSE SERPL-MCNC: 122 MG/DL (ref 70–99)
GLUCOSE UR STRIP-MCNC: NEGATIVE MG/DL
HCO3 SERPL-SCNC: 25 MMOL/L (ref 22–29)
HCT VFR BLD AUTO: 39.8 % (ref 35–47)
HGB BLD-MCNC: 12.9 G/DL (ref 11.7–15.7)
HGB UR QL STRIP: ABNORMAL
IMM GRANULOCYTES # BLD: 0 10E3/UL
IMM GRANULOCYTES NFR BLD: 0 %
KETONES UR STRIP-MCNC: NEGATIVE MG/DL
LEUKOCYTE ESTERASE UR QL STRIP: NEGATIVE
LYMPHOCYTES # BLD AUTO: 1.1 10E3/UL (ref 0.8–5.3)
LYMPHOCYTES NFR BLD AUTO: 14 %
MCH RBC QN AUTO: 31.1 PG (ref 26.5–33)
MCHC RBC AUTO-ENTMCNC: 32.4 G/DL (ref 31.5–36.5)
MCV RBC AUTO: 96 FL (ref 78–100)
MONOCYTES # BLD AUTO: 0.4 10E3/UL (ref 0–1.3)
MONOCYTES NFR BLD AUTO: 5 %
NEUTROPHILS # BLD AUTO: 6.4 10E3/UL (ref 1.6–8.3)
NEUTROPHILS NFR BLD AUTO: 80 %
NITRATE UR QL: NEGATIVE
NRBC # BLD AUTO: 0 10E3/UL
NRBC BLD AUTO-RTO: 0 /100
PH UR STRIP: 6 [PH] (ref 5–7)
PLATELET # BLD AUTO: 209 10E3/UL (ref 150–450)
POTASSIUM SERPL-SCNC: 4.1 MMOL/L (ref 3.4–5.3)
PROT SERPL-MCNC: 7.3 G/DL (ref 6.4–8.3)
RBC # BLD AUTO: 4.15 10E6/UL (ref 3.8–5.2)
RBC URINE: 1 /HPF
SODIUM SERPL-SCNC: 142 MMOL/L (ref 135–145)
SP GR UR STRIP: 1 (ref 1–1.03)
SQUAMOUS EPITHELIAL: <1 /HPF
UROBILINOGEN UR STRIP-MCNC: NORMAL MG/DL
WBC # BLD AUTO: 8 10E3/UL (ref 4–11)
WBC URINE: <1 /HPF

## 2025-07-18 PROCEDURE — 36415 COLL VENOUS BLD VENIPUNCTURE: CPT | Performed by: STUDENT IN AN ORGANIZED HEALTH CARE EDUCATION/TRAINING PROGRAM

## 2025-07-18 PROCEDURE — 81003 URINALYSIS AUTO W/O SCOPE: CPT | Performed by: STUDENT IN AN ORGANIZED HEALTH CARE EDUCATION/TRAINING PROGRAM

## 2025-07-18 PROCEDURE — 85004 AUTOMATED DIFF WBC COUNT: CPT | Performed by: STUDENT IN AN ORGANIZED HEALTH CARE EDUCATION/TRAINING PROGRAM

## 2025-07-18 PROCEDURE — 82310 ASSAY OF CALCIUM: CPT | Performed by: STUDENT IN AN ORGANIZED HEALTH CARE EDUCATION/TRAINING PROGRAM

## 2025-07-18 PROCEDURE — 99283 EMERGENCY DEPT VISIT LOW MDM: CPT | Performed by: STUDENT IN AN ORGANIZED HEALTH CARE EDUCATION/TRAINING PROGRAM

## 2025-07-18 ASSESSMENT — COLUMBIA-SUICIDE SEVERITY RATING SCALE - C-SSRS
1. IN THE PAST MONTH, HAVE YOU WISHED YOU WERE DEAD OR WISHED YOU COULD GO TO SLEEP AND NOT WAKE UP?: NO
6. HAVE YOU EVER DONE ANYTHING, STARTED TO DO ANYTHING, OR PREPARED TO DO ANYTHING TO END YOUR LIFE?: NO
2. HAVE YOU ACTUALLY HAD ANY THOUGHTS OF KILLING YOURSELF IN THE PAST MONTH?: NO

## 2025-07-18 ASSESSMENT — ACTIVITIES OF DAILY LIVING (ADL)
ADLS_ACUITY_SCORE: 41
ADLS_ACUITY_SCORE: 41

## 2025-07-18 NOTE — ED PROVIDER NOTES
Emergency Department Note      History of Present Illness     Chief Complaint   Rash      HPI   Myla Davis is a 54 year old female with history of breast cancer who presents for a rash. Patient was in [place] from 6/8-6/24, where she recalls developing a mild leg rash on her legs on 6/14. She thought it was bug bites. She later developed URI symptoms from a traveling group member towards the end of her trip, and was seen in urgent care on 6/28 upon arrival back to the [facility] where she was prescribed Augmentin and prednisone. Patient states the leg rashes seemed to worsen after starting the antibiotics, though she has since completed the course. She was seen by family medicine on 7/2 and had normal labs with negative blood cultures. Patient denies significant pain, but reports joint pain in her toes and mild swelling in her ankles. The a handful of the lesions have been draining fluid. No itching to the area. No mouth lesions or urinary symptoms. No abdominal pain, nausea, vomiting, or diarrhea. She did have a fever for one day during respiratory illness, but no other associated fevers. Respiratory symptoms have improved. Patient has been applying triple antibiotic ointment and cortisone cream to the areas that are open. No new medications. She reports a history of sensitivity to mosquitos. She also has a history of breast cancer approximately 11 years ago. No history of autoimmune disorders. Eats a normal diet.     Independent Historian   None    Review of External Notes   I reviewed primary care note from 7/2/25: Labs were essentially normal with negative blood cultures.    Past Medical History     Medical History and Problem List   Asthma  Breast cancer  GERD  Allergic state    Medications   Albuterol  Aspirin 81 mg  Singulair   Prilosec  Caltrate   Nolvadex    Surgical History   Past Surgical History:   Procedure Laterality Date    ------------OTHER-------------  12/2006    left ovary removed     BIOPSY  BREAST SEED LOCALIZATION  2/13/2014    Procedure: BIOPSY BREAST SEED LOCALIZATION;  Right Axillary Excisional biopsy with seed localization;  Surgeon: Sarah Welch MD;  Location: [facility] OR    DAVINCI HYSTERECTOMY TOTAL, BILATERAL SALPINGO-OOPHORECTOMY, COMBINED Bilateral 3/18/2021    Procedure: ROBOTIC-ASSISTED TOTAL LAPAROSCOPIC HYSTERECTOMY, RIGHT SALPINGO-OOPHORECTOMY, CYSTOSCOPY;  Surgeon: Alexia Sterling MD;  Location: [facility] OR    DAVINCI LYSIS OF ADHESIONS N/A 3/18/2021    Procedure: DAVINCI LYSIS OF ADHESION;  Surgeon: Alexia Sterling MD;  Location: [facility] OR    GYN SURGERY      INSERT PORT VASCULAR ACCESS  9/25/2013    Procedure: INSERT PORT VASCULAR ACCESS;  Insertion of power port into Left external jugular;  Surgeon: Sarah Welch MD;  Location: [facility] OR    MASTECTOMY SIMPLE BILATERAL, SENTINEL NODE BILATERAL, COMBINED  2/4/2014    Procedure: COMBINED MASTECTOMY SIMPLE BILATERAL, SENTINEL NODE BILATERAL;  Bilateral Mastectomy, Right [place] Node Biopsy, Removal of power port, Placement of Bilateral Tissue Expanders ;  Surgeon: Sarah Welch MD;  Location: [facility] OR    OVARY SURGERY      left oopherectomy    PROCURE GRAFT FAT Bilateral 11/4/2014    Procedure: PROCURE GRAFT FAT;  Surgeon: Jaida Hernandes MD;  Location: [facility] OR    RECONSTRUCT BREAST BILATERAL, IMPLANT PROSTHESIS BILATERAL, COMBINED Bilateral 11/4/2014    Procedure: COMBINED RECONSTRUCT BREAST BILATERAL, IMPLANT PROSTHESIS BILATERAL;  Surgeon: Jaida Hernandes MD;  Location: [facility] OR    RECONSTRUCT BREAST, INSERT TISSUE EXPANDER BILATERAL, COMBINED  2/4/2014    Procedure: COMBINED RECONSTRUCT BREAST, INSERT TISSUE EXPANDER BILATERAL;;  Surgeon: Jaida Hernandes MD;  Location: [facility] OR       Physical Exam     Patient Vitals for the past 24 hrs:   BP Temp Temp src Pulse Resp SpO2 Height Weight   07/18/25 1105 122/75 -- -- 88 16 100 % -- --   07/18/25 0758 130/88 98.9  F (37.2  C) Temporal 109 16 98 %  "1.651 m (5' 5\") 59.9 kg (132 lb 0.9 oz)     Physical Exam  Vital signs and nursing notes reviewed.    General:  Alert and oriented, no acute distress.   Skin: Skin is warm and dry.   Non-blanchable purpuric rash to bilateral lower legs and feet, varying in size.  A handful of lesions with vesicular blisters and scabbing. No significant erythema, lymphangitis streaking. Toes warm and well perfused without necrosis   HEENT:   Head: Normocephalic, atraumatic. Facial features symmetric.   Eyes: Conjunctiva pink, sclera white. EOMs grossly intact.   Ears: Auricles without lesion, erythema, or edema.   Nose: Symmetric with no discharge.  Mouth and throat: Lips are moist with no lesions or edema  Neck: Normal range of motion.   CV:  Heart RRR. 2+ radial and tibialis posterior pulses bilaterally. No significant peripheral edema.  Pulm/Chest: Chest wall expansion symmetric with no increased effort of breathing. Lungs clear and equal to auscultation bilaterally.   Abd: Abdomen is soft and nontender to palpation in all 4 quadrants with no guarding or rebound.   M/S: Moves all extremities spontaneously.  Psych: Normal mood and affect. Behavior is normal.                         Diagnostics     Lab Results   Labs Ordered and Resulted from Time of ED Arrival to Time of ED Departure   COMPREHENSIVE METABOLIC PANEL - Abnormal       Result Value    Sodium 142      Potassium 4.1      Carbon Dioxide (CO2) 25      Anion Gap 11      Urea Nitrogen 12.1      Creatinine 0.66      GFR Estimate >90      Calcium 9.7      Chloride 106      Glucose 122 (*)     Alkaline Phosphatase 81      AST 24      ALT 25      Protein Total 7.3      Albumin 4.4      Bilirubin Total 0.4     ROUTINE UA WITH MICROSCOPIC REFLEX TO CULTURE - Abnormal    Color Urine Straw      Appearance Urine Clear      Glucose Urine Negative      Bilirubin Urine Negative      Ketones Urine Negative      Specific Gravity Urine 1.005      Blood Urine Small (*)     pH Urine 6.0      " Protein Albumin Urine Negative      Urobilinogen Urine Normal      Nitrite Urine Negative      Leukocyte Esterase Urine Negative      RBC Urine 1      WBC Urine <1      Squamous Epithelials Urine <1     CBC WITH PLATELETS AND DIFFERENTIAL    WBC Count 8.0      RBC Count 4.15      Hemoglobin 12.9      Hematocrit 39.8      MCV 96      MCH 31.1      MCHC 32.4      RDW 12.4      Platelet Count 209      % Neutrophils 80      % Lymphocytes 14      % Monocytes 5      % Eosinophils 1      % Basophils 0      % Immature Granulocytes 0      NRBCs per 100 WBC 0      Absolute Neutrophils 6.4      Absolute Lymphocytes 1.1      Absolute Monocytes 0.4      Absolute Eosinophils 0.1      Absolute Basophils 0.0      Absolute Immature Granulocytes 0.0      Absolute NRBCs 0.0       Independent Interpretation   None    ED Course      Medications Administered   Medications - No data to display    Discussion of Management   None    ED Course   ED Course as of 07/18/25 1701   Fri Jul 18, 2025   0920 PA student assessed the patient   1026 I reassessed the patient and updated them on results and plan of care.      1048 I reassessed the patient and updated them on results and plan of care.        Additional Documentation  None    Medical Decision Making / Diagnosis     CMS Diagnoses: None    MIPS   None    MDM   Myla Davis is a 54 year old female with history of breast cancer in remission who presents to the emergency department for rash on her bilateral legs for the past 3 weeks.  See HPI.  Vitals are stable.  On exam, patient has a nonblanchable purpuric rash with varying size of lesions.  A few of them have overlying vesicle blisters.  No evidence of superimposed bacterial infection such as cellulitis, lymphangitis, abscess.  All of her toes are warm and well-perfused without evidence of necrosis.  Labs today are reassuring including CBC without leukocytosis, anemia, thrombocytopenia, and CMP without electrolyte, renal, metabolic, or  hepatic abnormalities.  UA without significant hematuria or proteinuria.  No belly pain to suggest Hen[place] Schonlein Purpura.  No evidence of glomerulonephritis.  Doubt scurvy given normal healthy diet. She has no headaches, weight loss, fevers, seizures, vision changes, or other neurologic abnormalities.  From the emergency perspective, I feel she is safe for discharge home.  Recommend close follow-up with dermatology for probable biopsy.  Through shared decision making with patient, will hold off on steroids as this could affect biopsy, and patient does not appreciate the side effects of the medication.  Discussed wound cares for lesions at open, and strict return precautions which include surrounding erythema, purulent drainage, fevers, or proximal streaking.  Patient agreed with the plan and had questions answered    Disposition   The patient was discharged.     Diagnosis     ICD-10-CM    1. Rash and nonspecific skin eruption  R21 Adult Dermatology  Referral      2. Purpura  D69.2 Adult Dermatology  Referral         Discharge Medications   Discharge Medication List as of 7/18/2025 11:07 AM        Scribe Disclosure:  Ana M RICHARDS, am serving as a scribe at 9:18 AM on 7/18/2025 to document services personally performed by Claire Duque PA-C based on my observations and the provider's statements to me.   Claire Duque PA-C on 7/18/2025 at 5:01 PM         Claire Duque PA-C  07/18/25 [id number]

## 2025-07-18 NOTE — ED TRIAGE NOTES
Patient presents to ED with complaints of wounds on her legs that have been traveling upwards. Patient believes they may have bug bites from traveling through [place]. Been on Augmentin for URI for the last 2 weeks. Had blood work done on 7/2. ABCs intact

## 2025-07-18 NOTE — DISCHARGE INSTRUCTIONS
Schedule an appointment with dermatology.  A referral was placed and they should be contacting you to schedule this appointment  Return to the ER if you develop shortness of breath, severe headaches, seizure-like activity, vision changes, significant redness or increased pain around one of the lesions, purulent drainage from an area, fevers, or further emergent concerns.  Otherwise, keep the area clean once daily with soap and water, pat dry, and cover open lesions with ointment and a bandage

## 2025-07-21 ENCOUNTER — OFFICE VISIT (OUTPATIENT)
Dept: DERMATOLOGY | Facility: CLINIC | Age: 55
End: 2025-07-21
Attending: STUDENT IN AN ORGANIZED HEALTH CARE EDUCATION/TRAINING PROGRAM
Payer: COMMERCIAL

## 2025-07-21 DIAGNOSIS — I77.6 SMALL VESSEL VASCULITIS: Primary | ICD-10-CM

## 2025-07-21 DIAGNOSIS — L98.491 SKIN ULCER, LIMITED TO BREAKDOWN OF SKIN (H): ICD-10-CM

## 2025-07-21 LAB
ALBUMIN MFR UR ELPH: <6 MG/DL
ALBUMIN UR-MCNC: NEGATIVE MG/DL
APPEARANCE UR: CLEAR
BILIRUB UR QL STRIP: NEGATIVE
COLOR UR AUTO: YELLOW
CREAT UR-MCNC: 6.7 MG/DL
GLUCOSE UR STRIP-MCNC: NEGATIVE MG/DL
HGB UR QL STRIP: ABNORMAL
KETONES UR STRIP-MCNC: NEGATIVE MG/DL
LEUKOCYTE ESTERASE UR QL STRIP: NEGATIVE
NITRATE UR QL: NEGATIVE
PH UR STRIP: 6 [PH] (ref 5–7)
PROT/CREAT 24H UR: NORMAL MG/G{CREAT}
RBC #/AREA URNS AUTO: ABNORMAL /HPF
SP GR UR STRIP: <=1.005 (ref 1–1.03)
SQUAMOUS #/AREA URNS AUTO: ABNORMAL /LPF
UROBILINOGEN UR STRIP-ACNC: 0.2 E.U./DL
WBC #/AREA URNS AUTO: ABNORMAL /HPF

## 2025-07-21 PROCEDURE — 84156 ASSAY OF PROTEIN URINE: CPT | Performed by: STUDENT IN AN ORGANIZED HEALTH CARE EDUCATION/TRAINING PROGRAM

## 2025-07-21 PROCEDURE — 81001 URINALYSIS AUTO W/SCOPE: CPT | Performed by: STUDENT IN AN ORGANIZED HEALTH CARE EDUCATION/TRAINING PROGRAM

## 2025-07-21 PROCEDURE — 36415 COLL VENOUS BLD VENIPUNCTURE: CPT | Performed by: STUDENT IN AN ORGANIZED HEALTH CARE EDUCATION/TRAINING PROGRAM

## 2025-07-21 RX ORDER — CLOBETASOL PROPIONATE 0.5 MG/G
OINTMENT TOPICAL 2 TIMES DAILY
Qty: 60 G | Refills: 3 | Status: SHIPPED | OUTPATIENT
Start: 2025-07-21

## 2025-07-21 NOTE — PROGRESS NOTES
[facility] Dermatology Note    Encounter Date: Jul 21, 2025    Dermatology Problem List:      Major PMHx  Breast cancer s/p tamoxifen    Social Hx:  ______________________________________    Impression/Plan:  Myla was seen today for derm problem.    Diagnoses and all orders for this visit:    Small vessel vasculitis  -     NV PUNCH BIOPSY OF SKIN, FIRST/SINGLE LESION  -     clobetasol (TEMOVATE) 0.05 % external ointment; Apply topically 2 times daily.  -     Routine UA with micro reflex to culture  -     Protein  random urine; Future  -     Dermatological Path Order and Indications; Standing  -     Dermatological Path Order and Indications  -     SSA Ro MARTHA Antibody IgG  -     SSB La MARTHA Antibody IgG  -Small vessel vasculitis ongoing for about 3 to 4 weeks, has not gotten any new lesions other than one she thinks appeared on her left ankle on Friday otherwise things seem to be improving  - The rash appeared after several days of upper respiratory tract infection symptoms and the administration of beta-lactam antibiotics  - She has no history of previous episodes like this  - She has no concerning review of system's symptoms and denies fever headache malaise dyspnea hemoptysis arthritis abdominal pain dysuria or hematuria diarrhea or hematochezia  - She denies xerostomia and xerophthalmia  - She does have small amount of blood in her urine going back several years but has normal kidney function  - Repeat UA, order urine protein which was not previously done and check SSA and SSB to eval for sjogrens which can predispose to small vessel vasculitis and can have a slow and insidious onset  -Apply clobetasol twice daily to areas of purpura given that some of these areas have become bullous we want to shut that down to prevent the development of wounds            Skin ulcer, limited to breakdown of skin (H)  -At sites of bullous vasculitis  - Apply Vaseline and bandage nightly  - She has been using  topical antibiotic  Other orders  -     Adult Dermatology  Referral        After discussion of benefits and risks including but not limited to bleeding, infection, scar, incomplete removal, recurrence, and non-diagnostic biopsy, written consent and photographs were obtained. The area was cleaned with isopropyl alcohol. 2 mL of 1% lidocaine with epinephrine was injected to obtain adequate anesthesia of the lesion on the L ankle. A 4 mm punch biopsy was performed. 4-0 Prolene sutures were utilized to approximate the epidermal edges. White petrolatum ointment and a bandage was applied to the wound. Explicit verbal and written wound care instructions were provided. The patient left the dermatology clinic in good condition.    Follow-up in pending results.       Staff Involved:  Staff Only    Rui Payne MD   of Dermatology  Department of Dermatology  [facility] [employer] [facility]      CC:   Chief Complaint   Patient presents with    Derm Problem     Right ankle and left ankle has Open wound.       History of Present Illness:  Ms. Myla Davis is a 54 year old female who presents as a new patient.    Myla is a 54-year-old female who presented to the ER initially on July 18.  At that visit she described a history of a trip to [place] from June 8 to June 24.  During that time she recalled a mild leg rash occurring on the 14th roughly a week into her trip.  She stated she thought it was bug bites.  Later on in the trip she developed upper respiratory tract infection type symptoms around 6/22.  She sought care at an urgent care 2 weeks later on June 28 at which time she was prescribed Augmentin and and prednisone.  She recalls that the leg rashes seem to worsen after starting antibiotics.  She had a visit with her family medicine doctor on July 2 some labs including blood cultures were ordered and most things were negative.  She endorsed joint pain in her toes and mild  swelling of her ankles at that time.  She denied abdominal pain vomiting and diarrhea.    The rash at the ER visit was described as nonblanching purpura.  CBC the was normal.  Creatinine and BUN were normal.  UA with small hematuria    Labs:      Physical exam:  Vitals: LMP 09/18/2013   GEN: well developed, well-nourished, in no acute distress, in a pleasant mood.     SKIN: Perez phototype 1  - Focused examination of the legs was performed.  - Scattered small nonblanching purpura  - Weaning vesiculation and superficial scabbing at sites of previous ruptured bullae and vesicles  - No other lesions of concern on areas examined.     Past Medical History:   Past Medical History:   Diagnosis Date    Abnormal maternal glucose tolerance, complicating pregnancy, childbirth, or the puerperium, unspecified as to episode of care 2003, 2004    In both pregnancies, not on insulin    Breast cancer (H)     Cancer (H)     Gastro-oesophageal reflux disease     Mild intermittent asthma     excercise induced    Nasal/sinus dis NEC     PONV (postoperative nausea and vomiting)      Past Surgical History:   Procedure Laterality Date    ------------OTHER-------------  12/2006    left ovary removed     BIOPSY BREAST SEED LOCALIZATION  2/13/2014    Procedure: BIOPSY BREAST SEED LOCALIZATION;  Right Axillary Excisional biopsy with seed localization;  Surgeon: Sarah Welch MD;  Location: [facility] OR    DAVINCI HYSTERECTOMY TOTAL, BILATERAL SALPINGO-OOPHORECTOMY, COMBINED Bilateral 3/18/2021    Procedure: ROBOTIC-ASSISTED TOTAL LAPAROSCOPIC HYSTERECTOMY, RIGHT SALPINGO-OOPHORECTOMY, CYSTOSCOPY;  Surgeon: Alexia Sterling MD;  Location: [facility] OR    DAVINCI LYSIS OF ADHESIONS N/A 3/18/2021    Procedure: DAVINCI LYSIS OF ADHESION;  Surgeon: Alexia Sterling MD;  Location:  OR    GYN SURGERY      INSERT PORT VASCULAR ACCESS  9/25/2013    Procedure: INSERT PORT VASCULAR ACCESS;  Insertion of power port into Left external  jugular;  Surgeon: Sarah Welch MD;  Location: [facility] OR    MASTECTOMY SIMPLE BILATERAL, SENTINEL NODE BILATERAL, COMBINED  2/4/2014    Procedure: COMBINED MASTECTOMY SIMPLE BILATERAL, SENTINEL NODE BILATERAL;  Bilateral Mastectomy, Right [place] Node Biopsy, Removal of power port, Placement of Bilateral Tissue Expanders ;  Surgeon: Sarah Welch MD;  Location: [facility] OR    OVARY SURGERY      left oopherectomy    PROCURE GRAFT FAT Bilateral 11/4/2014    Procedure: PROCURE GRAFT FAT;  Surgeon: Jaida Hernandes MD;  Location: [facility] OR    RECONSTRUCT BREAST BILATERAL, IMPLANT PROSTHESIS BILATERAL, COMBINED Bilateral 11/4/2014    Procedure: COMBINED RECONSTRUCT BREAST BILATERAL, IMPLANT PROSTHESIS BILATERAL;  Surgeon: Jaida Hernandes MD;  Location: [facility] OR    RECONSTRUCT BREAST, INSERT TISSUE EXPANDER BILATERAL, COMBINED  2/4/2014    Procedure: COMBINED RECONSTRUCT BREAST, INSERT TISSUE EXPANDER BILATERAL;;  Surgeon: Jaida Hernandes MD;  Location: [facility] OR       Social History:   reports that she has never smoked. She has never used smokeless tobacco. She reports current alcohol use. She reports that she does not use drugs.    Family History:  Family History   Problem Relation Age of Onset    Gallbladder Disease Mother     Hypertension Father     Diabetes Father         DM II at 60 yrs , on meds & insulin    C.A.D. Father         JACKSON , quadriple bypass at 60 yrs    Lipids Father     Gallbladder Disease Father     Aneurysm Paternal Grandfather     Cancer Maternal Grandmother         leukemia        Medications:  Current Outpatient Medications   Medication Sig Dispense Refill    clobetasol (TEMOVATE) 0.05 % external ointment Apply topically 2 times daily. 60 g 3    albuterol (PROAIR HFA/PROVENTIL HFA/VENTOLIN HFA) 108 (90 Base) MCG/ACT inhaler Inhale 2 puffs into the lungs 4 times daily as needed       calcium-vitamin D (CALTRATE) 600-400 MG-UNIT per tablet Take 1 tablet by mouth daily       Cetirizine HCl (ZYRTEC PO) Take 10 mg by mouth daily (with breakfast) PRN         fluticasone (FLONASE) 50 MCG/ACT nasal spray Spray 1 spray into both nostrils daily      guaiFENesin (MUCINEX) 600 MG 12 hr tablet Take 1,200 mg by mouth 2 times daily as needed      Influenza Vac Tiss-Cult Subunt (FLUCELVAX IM)       MAGNESIUM PO       montelukast (SINGULAIR) 10 MG tablet Take 10 mg by mouth At Bedtime      OMEPRAZOLE PO Take 40 mg by mouth every morning      Probiotic Product (PROBIOTIC PO) Take 1 Dose by mouth daily (with breakfast)      Pseudoephedrine HCl (SUDAFED PO) Take 30 mg by mouth 2 times daily as needed       vitamin  B complex with vitamin C (VITAMIN  B COMPLEX) TABS Take 1 tablet by mouth daily      VITAMIN D, CHOLECALCIFEROL, PO Take 1,000 Units by mouth daily       Allergies   Allergen Reactions    Vancomycin Rash     Rash itching hives    Percocet [Oxycodone-Acetaminophen] Itching

## 2025-07-21 NOTE — LETTER
7/21/2025      Myla Davis  [address] [facility] [address]      Dear Colleague,    Thank you for referring your patient, Myla Davis, to the [facility]. Please see a copy of my visit note below.    [facility] Dermatology Note    Encounter Date: Jul 21, 2025    Dermatology Problem List:      Major PMHx  Breast cancer s/p tamoxifen    Social Hx:  ______________________________________    Impression/Plan:  Myla was seen today for derm problem.    Diagnoses and all orders for this visit:    Small vessel vasculitis  -     [place] PUNCH BIOPSY OF SKIN, FIRST/SINGLE LESION  -     clobetasol (TEMOVATE) 0.05 % external ointment; Apply topically 2 times daily.  -     Routine UA with micro reflex to culture  -     Protein  random urine; Future  -     Dermatological Path Order and Indications; Standing  -     Dermatological Path Order and Indications  -     SSA Ro MARTHA Antibody IgG  -     SSB La MARTHA Antibody IgG  -Small vessel vasculitis ongoing for about 3 to 4 weeks, has not gotten any new lesions other than one she thinks appeared on her left ankle on Friday otherwise things seem to be improving  - The rash appeared after several days of upper respiratory tract infection symptoms and the administration of beta-lactam antibiotics  - She has no history of previous episodes like this  - She has no concerning review of system's symptoms and denies fever headache malaise dyspnea hemoptysis arthritis abdominal pain dysuria or hematuria diarrhea or hematochezia  - She denies xerostomia and xerophthalmia  - She does have small amount of blood in her urine going back several years but has normal kidney function  - Repeat UA, order urine protein which was not previously done and check SSA and SSB to eval for sjogrens which can predispose to small vessel vasculitis and can have a slow and insidious onset  -Apply clobetasol twice daily to areas of purpura given that some  of these areas have become bullous we want to shut that down to prevent the development of wounds            Skin ulcer, limited to breakdown of skin (H)  -At sites of bullous vasculitis  - Apply Vaseline and bandage nightly  - She has been using topical antibiotic  Other orders  -     Adult Dermatology  Referral        After discussion of benefits and risks including but not limited to bleeding, infection, scar, incomplete removal, recurrence, and non-diagnostic biopsy, written consent and photographs were obtained. The area was cleaned with isopropyl alcohol. 2 mL of 1% lidocaine with epinephrine was injected to obtain adequate anesthesia of the lesion on the L ankle. A 4 mm punch biopsy was performed. 4-0 Prolene sutures were utilized to approximate the epidermal edges. White petrolatum ointment and a bandage was applied to the wound. Explicit verbal and written wound care instructions were provided. The patient left the dermatology clinic in good condition.    Follow-up in pending results.       Staff Involved:  Staff Only    Rui Payne MD   of Dermatology  Department of Dermatology  [facility] [employer]      CC:   Chief Complaint   Patient presents with     Derm Problem     Right ankle and left ankle has Open wound.       History of Present Illness:  Ms. Myla Davis is a 54 year old female who presents as a new patient.    Myla is a 54-year-old female who presented to the ER initially on July 18.  At that visit she described a history of a trip to [place] from June 8 to June 24.  During that time she recalled a mild leg rash occurring on the 14th roughly a week into her trip.  She stated she thought it was bug bites.  Later on in the trip she developed upper respiratory tract infection type symptoms around 6/22.  She sought care at an urgent care 2 weeks later on June 28 at which time she was prescribed Augmentin and and prednisone.  She recalls that  the leg rashes seem to worsen after starting antibiotics.  She had a visit with her family medicine doctor on July 2 some labs including blood cultures were ordered and most things were negative.  She endorsed joint pain in her toes and mild swelling of her ankles at that time.  She denied abdominal pain vomiting and diarrhea.    The rash at the ER visit was described as nonblanching purpura.  CBC the was normal.  Creatinine and BUN were normal.  UA with small hematuria    Labs:      Physical exam:  Vitals: LMP 09/18/2013   GEN: well developed, well-nourished, in no acute distress, in a pleasant mood.     SKIN: Perez phototype 1  - Focused examination of the legs was performed.  - Scattered small nonblanching purpura  - Weaning vesiculation and superficial scabbing at sites of previous ruptured bullae and vesicles  - No other lesions of concern on areas examined.     Past Medical History:   Past Medical History:   Diagnosis Date     Abnormal maternal glucose tolerance, complicating pregnancy, childbirth, or the puerperium, unspecified as to episode of care 2003, 2004    In both pregnancies, not on insulin     Breast cancer (H)      Cancer (H)      Gastro-oesophageal reflux disease      Mild intermittent asthma     excercise induced     Nasal/sinus dis NEC      PONV (postoperative nausea and vomiting)      Past Surgical History:   Procedure Laterality Date     ------------OTHER-------------  12/2006    left ovary removed      BIOPSY BREAST SEED LOCALIZATION  2/13/2014    Procedure: BIOPSY BREAST SEED LOCALIZATION;  Right Axillary Excisional biopsy with seed localization;  Surgeon: Sarah Welch MD;  Location: [facility]     DAVINCI HYSTERECTOMY TOTAL, BILATERAL SALPINGO-OOPHORECTOMY, COMBINED Bilateral 3/18/2021    Procedure: ROBOTIC-ASSISTED TOTAL LAPAROSCOPIC HYSTERECTOMY, RIGHT SALPINGO-OOPHORECTOMY, CYSTOSCOPY;  Surgeon: Alexia Sterling MD;  Location: [facility] OR     DAVINCI LYSIS OF ADHESIONS N/A  3/18/2021    Procedure: DAVINCI LYSIS OF ADHESION;  Surgeon: Alexia Sterling MD;  Location: [facility] OR     GYN SURGERY       INSERT PORT VASCULAR ACCESS  9/25/2013    Procedure: INSERT PORT VASCULAR ACCESS;  Insertion of power port into Left external jugular;  Surgeon: Sarah Welch MD;  Location: [facility] OR     MASTECTOMY SIMPLE BILATERAL, SENTINEL NODE BILATERAL, COMBINED  2/4/2014    Procedure: COMBINED MASTECTOMY SIMPLE BILATERAL, SENTINEL NODE BILATERAL;  Bilateral Mastectomy, Right [place] Node Biopsy, Removal of power port, Placement of Bilateral Tissue Expanders ;  Surgeon: Sarah Welch MD;  Location: [facility] OR     OVARY SURGERY      left oopherectomy     PROCURE GRAFT FAT Bilateral 11/4/2014    Procedure: PROCURE GRAFT FAT;  Surgeon: Jaida Hernandes MD;  Location: [facility] OR     RECONSTRUCT BREAST BILATERAL, IMPLANT PROSTHESIS BILATERAL, COMBINED Bilateral 11/4/2014    Procedure: COMBINED RECONSTRUCT BREAST BILATERAL, IMPLANT PROSTHESIS BILATERAL;  Surgeon: Jaida Hernandes MD;  Location: [facility] OR     RECONSTRUCT BREAST, INSERT TISSUE EXPANDER BILATERAL, COMBINED  2/4/2014    Procedure: COMBINED RECONSTRUCT BREAST, INSERT TISSUE EXPANDER BILATERAL;;  Surgeon: Jaida Hernandes MD;  Location: [facility] OR       Social History:   reports that she has never smoked. She has never used smokeless tobacco. She reports current alcohol use. She reports that she does not use drugs.    Family History:  Family History   Problem Relation Age of Onset     Gallbladder Disease Mother      Hypertension Father      Diabetes Father         DM II at 60 yrs , on meds & insulin     C.A.D. Father         JACKSON , quadriple bypass at 60 yrs     Lipids Father      Gallbladder Disease Father      Aneurysm Paternal Grandfather      Cancer Maternal Grandmother         leukemia        Medications:  Current Outpatient Medications   Medication Sig Dispense Refill     clobetasol (TEMOVATE) 0.05 % external ointment Apply  topically 2 times daily. 60 g 3     albuterol (PROAIR HFA/PROVENTIL HFA/VENTOLIN HFA) 108 (90 Base) MCG/ACT inhaler Inhale 2 puffs into the lungs 4 times daily as needed        calcium-vitamin D (CALTRATE) 600-400 MG-UNIT per tablet Take 1 tablet by mouth daily       Cetirizine HCl (ZYRTEC PO) Take 10 mg by mouth daily (with breakfast) PRN          fluticasone (FLONASE) 50 MCG/ACT nasal spray Spray 1 spray into both nostrils daily       guaiFENesin (MUCINEX) 600 MG 12 hr tablet Take 1,200 mg by mouth 2 times daily as needed       Influenza Vac Tiss-Cult Subunt (FLUCELVAX IM)        MAGNESIUM PO        montelukast (SINGULAIR) 10 MG tablet Take 10 mg by mouth At Bedtime       OMEPRAZOLE PO Take 40 mg by mouth every morning       Probiotic Product (PROBIOTIC PO) Take 1 Dose by mouth daily (with breakfast)       Pseudoephedrine HCl (SUDAFED PO) Take 30 mg by mouth 2 times daily as needed        vitamin  B complex with vitamin C (VITAMIN  B COMPLEX) TABS Take 1 tablet by mouth daily       VITAMIN D, CHOLECALCIFEROL, PO Take 1,000 Units by mouth daily       Allergies   Allergen Reactions     Vancomycin Rash     Rash itching hives     Percocet [Oxycodone-Acetaminophen] Itching               Again, thank you for allowing me to participate in the care of your patient.        Sincerely,        Rui Payne MD    Electronically signed

## 2025-07-21 NOTE — PATIENT INSTRUCTIONS
You have small vessel vasculitis.  This was likely triggered by the Uri or antibiotics. We checked a biopsy and some labs to reassure us that this is a self limited process.     Apply clobtesol ointment twice daily to purple areas    Apply vaseline and bandage at night to open scabs to help them heal more quickly     Wound Care After a Biopsy    What is a skin biopsy?  A skin biopsy allows the doctor to examine a very small piece of tissue under the microscope to determine the diagnosis and the best treatment for the skin condition. A local anesthetic (numbing medicine) is injected with a very small needle into the skin area to be tested. A small piece of skin is taken from the area. Sometimes a suture (stitch) is used.     What are the risks of a skin biopsy?  I will experience scar, bleeding, swelling, pain, crusting and redness. I may experience incomplete removal or recurrence. Risks of this procedure are excessive bleeding, bruising, infection, nerve damage, numbness, thick (hypertrophic or keloidal) scar and non-diagnostic biopsy.    How should I care for my wound for the first 24 hours?  Keep the wound dry and covered for 24 hours  If it bleeds, hold direct pressure on the area for 15 minutes. If bleeding does not stop, call us or go to the emergency room  Avoid strenuous exercise the first 1-2 days or as your doctor instructs you    How should I care for the wound after 24 hours?  After 24 hours, remove the bandage  You may bathe or shower as normal  If you had a scalp biopsy, you can shampoo as usual and can use shower water to clean the biopsy site daily  Clean the wound once a day with gentle soap and water  Do not scrub, be gentle  Apply white petroleum/Vaseline after cleaning the wound with a cotton swab or a clean finger, and keep the site covered with a Bandaid /bandage. Bandages are not necessary with a scalp biopsy  If you are unable to cover the site with a Bandaid /bandage, re-apply ointment 2-3  times a day to keep the site moist. Moisture will help with healing  Avoid strenuous activity for first 1-2 days  Avoid lakes, rivers, pools, and oceans until the stitches are removed or the site is healed    How do I clean my wound?  Wash hands thoroughly with soap or use hand  before all wound care  Clean the wound with gentle soap and water  Apply white petroleum/Vaseline  to wound after it is clean  Replace the Bandaid /bandage to keep the wound covered for the first few days or as instructed by your doctor  If you had a scalp biopsy, warm shower water to the area on a daily basis should suffice    What should I use to clean my wound?   Cotton-tipped applicators (Qtips )  White petroleum jelly (Vaseline ). Use a clean new container and use Q-tips to apply.  Bandaids  as needed  Gentle soap     How should I care for my wound long term?  Do not get your wound dirty  Keep up with wound care for one week or until the area is healed.  If you have stitches, stitches need to be removed in 7 days. You may return to our clinic for this or you may have it done locally at your doctor s office.  A small scab will form and fall off by itself when the area is completely healed. The area will be red and will become pink in color as it heals. Sun protection is very important for how your scar will turn out. Sunscreen with an SPF 30 or greater is recommended once the area is healed.  You should have some soreness but it should be mild and slowly go away over several days. Talk to your doctor about using tylenol for pain,    When should I call my doctor?  If you have increased:   Pain or swelling  Pus or drainage (clear or slightly yellow drainage is ok)  Temperature over 100F  Spreading redness or warmth around wound    When will I hear about my results?  The biopsy results can take 2 weeks to come back.  Your results will automatically release to [facility] before your provider has even reviewed them.  The clinic will  call you with the results, send you a [employer] message, or have you schedule a follow-up clinic or phone time to discuss the results.  Contact our clinics if you do not hear from us in 2 weeks.    Who should I call with questions?  [facility]: [phone]  [facility]: [phone]  For urgent needs outside of business hours call the [facility] at [phone] and ask for the dermatology resident on call    car seat

## 2025-07-22 LAB
ENA SS-A AB SER IA-ACNC: <0.5 U/ML
ENA SS-A AB SER IA-ACNC: NEGATIVE
ENA SS-B IGG SER IA-ACNC: <0.6 U/ML
ENA SS-B IGG SER IA-ACNC: NEGATIVE

## 2025-07-28 LAB
PATH REPORT.COMMENTS IMP SPEC: NORMAL
PATH REPORT.FINAL DX SPEC: NORMAL
PATH REPORT.GROSS SPEC: NORMAL
PATH REPORT.MICROSCOPIC SPEC OTHER STN: NORMAL
PATH REPORT.RELEVANT HX SPEC: NORMAL

## 2025-08-18 ENCOUNTER — TELEPHONE (OUTPATIENT)
Dept: DERMATOLOGY | Facility: CLINIC | Age: 55
End: 2025-08-18
Payer: COMMERCIAL

## 2025-08-25 ENCOUNTER — LAB (OUTPATIENT)
Dept: LAB | Facility: CLINIC | Age: 55
End: 2025-08-25
Payer: COMMERCIAL

## 2025-08-25 DIAGNOSIS — D69.0 IGA MEDIATED LEUKOCYTOCLASTIC VASCULITIS: ICD-10-CM

## 2025-08-25 LAB
ALBUMIN MFR UR ELPH: <6 MG/DL
ALBUMIN SERPL BCG-MCNC: 4.1 G/DL (ref 3.5–5.2)
ALBUMIN UR-MCNC: NEGATIVE MG/DL
ALP SERPL-CCNC: 60 U/L (ref 40–150)
ALT SERPL W P-5'-P-CCNC: 22 U/L (ref 0–50)
ANION GAP SERPL CALCULATED.3IONS-SCNC: 8 MMOL/L (ref 7–15)
APPEARANCE UR: CLEAR
AST SERPL W P-5'-P-CCNC: 28 U/L (ref 0–45)
BACTERIA #/AREA URNS HPF: ABNORMAL /HPF
BILIRUB SERPL-MCNC: 0.4 MG/DL
BILIRUB UR QL STRIP: NEGATIVE
BUN SERPL-MCNC: 13.7 MG/DL (ref 6–20)
CALCIUM SERPL-MCNC: 9.5 MG/DL (ref 8.8–10.4)
CHLORIDE SERPL-SCNC: 106 MMOL/L (ref 98–107)
COLOR UR AUTO: YELLOW
CREAT SERPL-MCNC: 0.68 MG/DL (ref 0.51–0.95)
CREAT UR-MCNC: 33.4 MG/DL
EGFRCR SERPLBLD CKD-EPI 2021: >90 ML/MIN/1.73M2
GLUCOSE SERPL-MCNC: 94 MG/DL (ref 70–99)
GLUCOSE UR STRIP-MCNC: NEGATIVE MG/DL
HCO3 SERPL-SCNC: 26 MMOL/L (ref 22–29)
HGB UR QL STRIP: ABNORMAL
KETONES UR STRIP-MCNC: NEGATIVE MG/DL
LEUKOCYTE ESTERASE UR QL STRIP: NEGATIVE
NITRATE UR QL: NEGATIVE
PH UR STRIP: 6 [PH] (ref 5–7)
POTASSIUM SERPL-SCNC: 4.6 MMOL/L (ref 3.4–5.3)
PROT SERPL-MCNC: 6.9 G/DL (ref 6.4–8.3)
PROT/CREAT 24H UR: NORMAL MG/G{CREAT}
RBC #/AREA URNS AUTO: ABNORMAL /HPF
SODIUM SERPL-SCNC: 140 MMOL/L (ref 135–145)
SP GR UR STRIP: 1.01 (ref 1–1.03)
SQUAMOUS #/AREA URNS AUTO: ABNORMAL /LPF
UROBILINOGEN UR STRIP-ACNC: 0.2 E.U./DL
WBC #/AREA URNS AUTO: ABNORMAL /HPF

## 2025-08-25 PROCEDURE — 81001 URINALYSIS AUTO W/SCOPE: CPT

## 2025-08-25 PROCEDURE — 84156 ASSAY OF PROTEIN URINE: CPT

## 2025-08-25 PROCEDURE — 36415 COLL VENOUS BLD VENIPUNCTURE: CPT

## 2025-08-25 PROCEDURE — 80053 COMPREHEN METABOLIC PANEL: CPT

## (undated) DEVICE — DAVINCI XI DRAPE COLUMN 470341

## (undated) DEVICE — SOL NACL 0.9% INJ 1000ML BAG 2B1324X

## (undated) DEVICE — KIT PATIENT POSITIONING PIGAZZI LATEX FREE 40580

## (undated) DEVICE — GLOVE PROTEXIS W/NEU-THERA 6.5  2D73TE65

## (undated) DEVICE — ENDO TROCAR CONMED AIRSEAL BLADELESS 08X120MM IAS8-120LP

## (undated) DEVICE — DAVINCI HOT SHEARS TIP COVER  400180

## (undated) DEVICE — LINEN TOWEL PACK X5 5464

## (undated) DEVICE — GLOVE PROTEXIS MICRO 6.0  2D73PM60

## (undated) DEVICE — PACK DAVINCI GYN SMA15GDFS1

## (undated) DEVICE — ENDO TROCAR CONMED AIRSEAL BLADELESS 05X120MM IAS5-120LP

## (undated) DEVICE — DAVINCI XI OBTURATOR BLADELESS 8MM 470359

## (undated) DEVICE — SUCTION CANISTER MEDIVAC LINER 3000ML W/LID 65651-530

## (undated) DEVICE — SUCTION IRR STRYKERFLOW II W/TIP 250-070-520

## (undated) DEVICE — SU VICRYL 0 CT-1 27" J340H

## (undated) DEVICE — TUBING IRRIG CYSTO/BLADDER SET 81" LF 2C4040

## (undated) DEVICE — SOL WATER IRRIG 1000ML BOTTLE 2F7114

## (undated) DEVICE — DAVINCI XI SEAL UNIVERSAL 5-8MM 470361

## (undated) DEVICE — SU VICRYL 4-0 PS-2 18" UND J496H

## (undated) DEVICE — CATH TRAY FOLEY SURESTEP 16FR WDRAIN BAG STLK LATEX A300316A

## (undated) DEVICE — DAVINCI XI NDL DRIVER MEGA SUTURE CUT 8MM 470309

## (undated) DEVICE — RETR ELEV / UTERINE MANIPULATOR V-CARE SM CUP 60-6085-200

## (undated) DEVICE — SU STRATAFIX PDS PLUS 0 CT-1 30CM SXPP1B450

## (undated) DEVICE — DAVINCI XI DRAPE ARM 470015

## (undated) RX ORDER — ACETAMINOPHEN 325 MG/1
TABLET ORAL
Status: DISPENSED
Start: 2021-03-18

## (undated) RX ORDER — ONDANSETRON 2 MG/ML
INJECTION INTRAMUSCULAR; INTRAVENOUS
Status: DISPENSED
Start: 2021-03-18

## (undated) RX ORDER — VECURONIUM BROMIDE 1 MG/ML
INJECTION, POWDER, LYOPHILIZED, FOR SOLUTION INTRAVENOUS
Status: DISPENSED
Start: 2021-03-18

## (undated) RX ORDER — SCOLOPAMINE TRANSDERMAL SYSTEM 1 MG/1
PATCH, EXTENDED RELEASE TRANSDERMAL
Status: DISPENSED
Start: 2021-03-18

## (undated) RX ORDER — FENTANYL CITRATE 50 UG/ML
INJECTION, SOLUTION INTRAMUSCULAR; INTRAVENOUS
Status: DISPENSED
Start: 2021-03-18

## (undated) RX ORDER — BUPIVACAINE HYDROCHLORIDE 2.5 MG/ML
INJECTION, SOLUTION EPIDURAL; INFILTRATION; INTRACAUDAL
Status: DISPENSED
Start: 2021-03-18

## (undated) RX ORDER — HYDROMORPHONE HYDROCHLORIDE 1 MG/ML
INJECTION, SOLUTION INTRAMUSCULAR; INTRAVENOUS; SUBCUTANEOUS
Status: DISPENSED
Start: 2021-03-18

## (undated) RX ORDER — HYDROMORPHONE HYDROCHLORIDE 2 MG/1
TABLET ORAL
Status: DISPENSED
Start: 2021-03-18

## (undated) RX ORDER — GLYCOPYRROLATE 0.2 MG/ML
INJECTION, SOLUTION INTRAMUSCULAR; INTRAVENOUS
Status: DISPENSED
Start: 2021-03-18

## (undated) RX ORDER — CLINDAMYCIN PHOSPHATE 900 MG/50ML
INJECTION, SOLUTION INTRAVENOUS
Status: DISPENSED
Start: 2021-03-18

## (undated) RX ORDER — DEXAMETHASONE SODIUM PHOSPHATE 4 MG/ML
INJECTION, SOLUTION INTRA-ARTICULAR; INTRALESIONAL; INTRAMUSCULAR; INTRAVENOUS; SOFT TISSUE
Status: DISPENSED
Start: 2021-03-18

## (undated) RX ORDER — LIDOCAINE HYDROCHLORIDE 20 MG/ML
INJECTION, SOLUTION EPIDURAL; INFILTRATION; INTRACAUDAL; PERINEURAL
Status: DISPENSED
Start: 2021-03-18

## (undated) RX ORDER — FENTANYL CITRATE 0.05 MG/ML
INJECTION, SOLUTION INTRAMUSCULAR; INTRAVENOUS
Status: DISPENSED
Start: 2021-03-18

## (undated) RX ORDER — KETOROLAC TROMETHAMINE 30 MG/ML
INJECTION, SOLUTION INTRAMUSCULAR; INTRAVENOUS
Status: DISPENSED
Start: 2021-03-18

## (undated) RX ORDER — NEOSTIGMINE METHYLSULFATE 1 MG/ML
VIAL (ML) INJECTION
Status: DISPENSED
Start: 2021-03-18

## (undated) RX ORDER — PROPOFOL 10 MG/ML
INJECTION, EMULSION INTRAVENOUS
Status: DISPENSED
Start: 2021-03-18